# Patient Record
Sex: MALE | Race: WHITE | Employment: FULL TIME | URBAN - NONMETROPOLITAN AREA
[De-identification: names, ages, dates, MRNs, and addresses within clinical notes are randomized per-mention and may not be internally consistent; named-entity substitution may affect disease eponyms.]

---

## 2017-01-11 ENCOUNTER — RADIANT APPOINTMENT (OUTPATIENT)
Dept: GENERAL RADIOLOGY | Facility: CLINIC | Age: 37
End: 2017-01-11
Attending: FAMILY MEDICINE
Payer: COMMERCIAL

## 2017-01-11 ENCOUNTER — OFFICE VISIT (OUTPATIENT)
Dept: FAMILY MEDICINE | Facility: CLINIC | Age: 37
End: 2017-01-11
Payer: COMMERCIAL

## 2017-01-11 VITALS
SYSTOLIC BLOOD PRESSURE: 110 MMHG | HEIGHT: 68 IN | BODY MASS INDEX: 27.43 KG/M2 | DIASTOLIC BLOOD PRESSURE: 68 MMHG | WEIGHT: 181 LBS | HEART RATE: 69 BPM | OXYGEN SATURATION: 99 % | TEMPERATURE: 97.9 F | RESPIRATION RATE: 16 BRPM

## 2017-01-11 DIAGNOSIS — M25.562 ACUTE PAIN OF LEFT KNEE: Primary | ICD-10-CM

## 2017-01-11 DIAGNOSIS — M25.562 ACUTE PAIN OF LEFT KNEE: ICD-10-CM

## 2017-01-11 PROCEDURE — 73565 X-RAY EXAM OF KNEES: CPT

## 2017-01-11 PROCEDURE — 99213 OFFICE O/P EST LOW 20 MIN: CPT | Performed by: FAMILY MEDICINE

## 2017-01-11 NOTE — PROGRESS NOTES
"  SUBJECTIVE:                                                    Sergio Us is a 36 year old male who presents to clinic today for the following health issues:      Knee Pain     Onset: bothersome for years     Description:   Location: Mostly the left knee and right knee  Character: Dull ache    Intensity: moderate    Progression of Symptoms: worse    Accompanying Signs & Symptoms:  Other symptoms: none   History:   Previous similar pain: no       Precipitating factors:   Trauma or overuse: no     Alleviating factors:  Improved by: exercises       Therapies Tried and outcome: Joint supplements.  The more active he is the less pain.       S :Sergio Us is a 36 year old male with L knee pain off/on for years.  No injury.  No swelling/redness. nsaids help some.  Seems to improve with activity.      No leg /ankle swelling    Problem list, med list, additional histories reviewed and updated, as indicated.      O:/68 mmHg  Pulse 69  Temp(Src) 97.9  F (36.6  C) (Tympanic)  Resp 16  Ht 5' 7.75\" (1.721 m)  Wt 181 lb (82.101 kg)  BMI 27.72 kg/m2  SpO2 99%  GEN: Alert and oriented, in no acute distress  Knee without swelling/redness.  Full ROM. lachman's normal, bello's normal.  No medial/lateral ligament pain.  Some mild pain with manipulation of patella    Xray:' normal    A: anterior knee pain, intermittent    P: therapy.  Nsaids/ice prn when bothering.  F/u if not improving.  Sports med referral likely next step if not improving.   "

## 2017-01-11 NOTE — NURSING NOTE
"Chief Complaint   Patient presents with     Knee Pain     /68 mmHg  Pulse 69  Temp(Src) 97.9  F (36.6  C) (Tympanic)  Resp 16  Ht 5' 7.75\" (1.721 m)  Wt 181 lb (82.101 kg)  BMI 27.72 kg/m2  SpO2 99% Estimated body mass index is 27.72 kg/(m^2) as calculated from the following:    Height as of this encounter: 5' 7.75\" (1.721 m).    Weight as of this encounter: 181 lb (82.101 kg).  bp completed using cuff size: regular      Health Maintenance that is potentially due pending provider review:  Denied flu shot.   No other maintenance needed.    Anum Kirby      "

## 2017-01-11 NOTE — MR AVS SNAPSHOT
After Visit Summary   1/11/2017    Sergio Us    MRN: [id number]           Patient Information     Date Of Birth          1980        Visit Information        Provider Department      1/11/2017 4:00 PM Reno Xie MD [facility]        Today's Diagnoses     Acute pain of left knee    -  1        Follow-ups after your visit        Additional Services     PHYSICAL THERAPY REFERRAL       *This therapy referral will be filtered to a centralized scheduling office at [facility] and the patient will receive a call to schedule an appointment at a [facility] location most convenient for them. *     [facility] provides Physical Therapy evaluation and treatment and many specialty services across the [facility] system.  If requesting a specialty program, please choose from the list below.    If you have not heard from the scheduling office within 2 business days, please call [phone] for all locations, with the exception of [place], please call [phone].  Treatment: Evaluation & Treatment  Special Instructions/Modalities: anterior knee pain  Special Programs:     Please be aware that coverage of these services is subject to the terms and limitations of your health insurance plan.  Call member services at your health plan with any benefit or coverage questions.      **Note to Provider:  If you are referring outside of [facility] for the therapy appointment, please list the name of the location in the  special instructions  above, print the referral and give to the patient to schedule the appointment.                  Who to contact     If you have questions or need follow up information about today's clinic visit or your schedule please contact [facility] directly at [phone].  Normal or non-critical lab and imaging results will be communicated to you by MyChart, letter or phone within 4 business days after the clinic has  "received the results. If you do not hear from us within 7 days, please contact the clinic through [employer] or phone. If you have a critical or abnormal lab result, we will notify you by phone as soon as possible.  Submit refill requests through [employer] or call your pharmacy and they will forward the refill request to us. Please allow 3 business days for your refill to be completed.          Additional Information About Your Visit        [employer] Information     [employer] lets you send messages to your doctor, view your test results, renew your prescriptions, schedule appointments and more. To sign up, go to [email or web address] . Click on \"Log in\" on the left side of the screen, which will take you to the Welcome page. Then click on \"Sign up Now\" on the right side of the page.     You will be asked to enter the access code listed below, as well as some personal information. Please follow the directions to create your username and password.     Your access code is: [email or web address]  Expires: 2017  4:30 PM     Your access code will  in 90 days. If you need help or a new code, please call your [facility] clinic or [phone].        Care EveryWhere ID     This is your Care EveryWhere ID. This could be used by other organizations to access your [facility] medical records  [id number]        Your Vitals Were     Pulse Temperature Respirations Height BMI (Body Mass Index) Pulse Oximetry    69 97.9  F (36.6  C) (Tympanic) 16 5' 7.75\" (1.721 m) 27.72 kg/m2 99%       Blood Pressure from Last 3 Encounters:   17 110/68   16 120/76   06/15/16 113/63    Weight from Last 3 Encounters:   17 181 lb (82.101 kg)   16 187 lb (84.823 kg)   06/15/16 182 lb 3.2 oz (82.645 kg)              We Performed the Following     PHYSICAL THERAPY REFERRAL        Primary Care Provider Office Phone # Fax #    Reno Xie [phone]       [facility] [address] [place] [address] " [phone]        Thank you!     Thank you for choosing [facility]  for your care. Our goal is always to provide you with excellent care. Hearing back from our patients is one way we can continue to improve our services. Please take a few minutes to complete the written survey that you may receive in the mail after your visit with us. Thank you!             Your Updated Medication List - Protect others around you: Learn how to safely use, store and throw away your medicines at www.disposemymeds.org.          This list is accurate as of: 1/11/17  4:30 PM.  Always use your most recent med list.                   Brand Name Dispense Instructions for use    ALEVE 220 MG tablet   Generic drug:  naproxen sodium      Take 1 tablet by mouth daily as needed       B COMPLEX PO          FIBER CHOICE PO          glucosamine-chondroitin 500-400 MG Caps per capsule      Take 1 capsule by mouth daily       MAGNESIUM OXIDE PO          Multi-vitamin Tabs tablet      Take 1 tablet by mouth daily       VITAMIN B 12 PO          VITAMIN C PO          VITAMIN D3 PO      Take by mouth daily

## 2017-01-16 ENCOUNTER — HOSPITAL ENCOUNTER (OUTPATIENT)
Dept: PHYSICAL THERAPY | Facility: CLINIC | Age: 37
Setting detail: THERAPIES SERIES
End: 2017-01-16
Attending: FAMILY MEDICINE
Payer: COMMERCIAL

## 2017-01-16 DIAGNOSIS — M25.562 ACUTE PAIN OF LEFT KNEE: Primary | ICD-10-CM

## 2017-01-16 PROCEDURE — 40000718 ZZHC STATISTIC PT DEPARTMENT ORTHO VISIT: Performed by: PHYSICAL THERAPIST

## 2017-01-16 PROCEDURE — 97161 PT EVAL LOW COMPLEX 20 MIN: CPT | Mod: GP | Performed by: PHYSICAL THERAPIST

## 2017-01-16 PROCEDURE — 97110 THERAPEUTIC EXERCISES: CPT | Mod: GP | Performed by: PHYSICAL THERAPIST

## 2017-01-16 NOTE — PROGRESS NOTES
Physical Therapy Evaluation  01/16/17 1400   General Information   Type of Visit Initial OP Ortho PT Evaluation   Start of Care Date 01/16/17   Referring Physician Dr. Xie   Patient/Family Goals Statement Decrease knee pain   Orders Evaluate and Treat   Date of Order 01/11/17   Insurance Type Other  ([employer])   Medical Diagnosis Knee pain   Surgical/Medical history reviewed Yes   Precautions/Limitations no known precautions/limitations       Present No   Body Part(s)   Body Part(s) Knee   Presentation and Etiology   Pertinent history of current problem (include personal factors and/or comorbidities that impact the POC) Bilateral anterior knee pain that improves and worsens day to day.   Impairments A. Pain;B. Decreased WB tolerance;H. Impaired gait   Functional Limitations perform activities of daily living;perform required work activities;perform desired leisure / sports activities   Symptom Location Anterior bilateral knees   How/Where did it occur From insidious onset   Onset date of current episode/exacerbation 01/11/17   Chronicity Recurrent   Pain rating (0-10 point scale) Best (/10);Worst (/10)   Best (/10) 0   Worst (/10) 7   Pain quality A. Sharp;B. Dull;C. Aching;E. Shooting   Frequency of pain/symptoms A. Constant   Pain/symptoms are: The same all the time   Pain/symptoms exacerbated by E. Rest   Pain/symptoms eased by I. OTC medication(s)   Progression of symptoms since onset: Unchanged   Current / Previous Interventions   Diagnostic Tests: X-ray   X-ray Results unremarkable   Prior Level of Function   Prior Level of Function-Mobility independent   Prior Level of Function-ADLs independent   Functional Level Prior Comment independent   Fall Risk Screen   Fall screen completed by PT   Per patient - Fall 2 or more times in past year? Yes   Per patient - Fall with injury in past year? No   Is patient a fall risk? No   Fall screen comments Fell on ice   Functional Scales   Functional  Scales Other   Other Scales  LEFS: 52/80   Knee Objective Findings   Side (if bilateral, select both right and left) Left   Observation patella laterally placed   Posture normal   Gait/Locomotion normal   Foot Position In Standing ER, pronated ankles   Anterior Drawer Test neg   Posterior Drawer Test neg   Varus Stress Test neg   Valgus Stress Test neg   Pantera's Test neg   Apley's Test neg   Palpation TTP lateral and medial patella bilaterally, R ITband   Accessory Motion/Joint Mobility patellar hypermobility   Left Knee Extension AROM 1-2* hyperextension   Left Knee Flexion AROM 135*   Left Knee Flexion Strength 4/5 B   Left Knee Extension Strength 4/5 B   Left Hip Abduction Strength 4-/5 B   Left Quad Set Strength 4-/5 B   Left Gastrocnemius Flexibility limited B   Left Hamstring Flexibility limited B L>R   Left Hip Flexor Flexibility WNL   Planned Therapy Interventions   Planned Therapy Interventions balance training;gait training;joint mobilization;manual therapy;neuromuscular re-education;ROM;strengthening;stretching   Planned Modality Interventions   Planned Modality Interventions Cryotherapy;Electrical stimulation;Hot packs;Iontophoresis;TENS;Traction;Ultrasound   Clinical Impression   Criteria for Skilled Therapeutic Interventions Met yes, treatment indicated   PT Diagnosis Bilateral patellofemoral syndrome   Influenced by the following impairments weakness, patellar hypermobility, decreased flexibility   Functional limitations due to impairments pain with walking, stairs, sitting for long periods of time   Clinical Presentation Stable/Uncomplicated   Clinical Presentation Rationale no co morbidities   Clinical Decision Making (Complexity) Low complexity   Therapy Frequency 1 time/week   Predicted Duration of Therapy Intervention (days/wks) 8 weeks   Risk & Benefits of therapy have been explained Yes   Patient, Family & other staff in agreement with plan of care Yes   Clinical Impression Comments Patient  presents with signs and symptoms of patellofemoral syndrome bilaterally.  The patient demonstrates hip weaknss, decreased flexiblity, quad weakness, and patellar hypermobility and would benefit from conitnued PT to address above deficits.   Education Assessment   Preferred Learning Style Listening;Pictures/video;Demonstration   Barriers to Learning No barriers   ORTHO GOALS   PT Ortho Eval Goals 1;2;3   Ortho Goal 1   Goal Identifier stairs   Goal Description Patient will tolerate ambulating a flight of stairs without an increase in sytmpoms   Target Date 03/13/17   Ortho Goal 2   Goal Identifier gait   Goal Description Patient will ambulate 1/2 mile without an increase in sytmpoms in order to shop or work out at the gym.   Target Date 03/13/17   Ortho Goal 3   Goal Identifier Working out   Goal Description Patient will tolerate performing the leg press machine at the gym without an increase in symptoms.   Target Date 03/13/17   Total Evaluation Time   Total Evaluation Time 30 min   Please contact me with any questions or concerns.    Thank you for your referral,     Minda Swanson, PT, DPT  Physical Therapist  [facility]  [phone]

## 2017-01-25 ENCOUNTER — HOSPITAL ENCOUNTER (OUTPATIENT)
Dept: PHYSICAL THERAPY | Facility: CLINIC | Age: 37
Setting detail: THERAPIES SERIES
End: 2017-01-25
Attending: FAMILY MEDICINE
Payer: COMMERCIAL

## 2017-01-25 PROCEDURE — 40000718 ZZHC STATISTIC PT DEPARTMENT ORTHO VISIT: Performed by: PHYSICAL THERAPIST

## 2017-01-25 PROCEDURE — 97110 THERAPEUTIC EXERCISES: CPT | Mod: GP | Performed by: PHYSICAL THERAPIST

## 2017-02-01 ENCOUNTER — HOSPITAL ENCOUNTER (OUTPATIENT)
Dept: PHYSICAL THERAPY | Facility: CLINIC | Age: 37
Setting detail: THERAPIES SERIES
End: 2017-02-01
Attending: FAMILY MEDICINE
Payer: COMMERCIAL

## 2017-02-01 PROCEDURE — 40000718 ZZHC STATISTIC PT DEPARTMENT ORTHO VISIT: Performed by: PHYSICAL THERAPIST

## 2017-02-01 PROCEDURE — 97110 THERAPEUTIC EXERCISES: CPT | Mod: GP | Performed by: PHYSICAL THERAPIST

## 2017-02-08 ENCOUNTER — HOSPITAL ENCOUNTER (OUTPATIENT)
Dept: PHYSICAL THERAPY | Facility: CLINIC | Age: 37
Setting detail: THERAPIES SERIES
End: 2017-02-08
Attending: FAMILY MEDICINE
Payer: COMMERCIAL

## 2017-02-08 PROCEDURE — 40000718 ZZHC STATISTIC PT DEPARTMENT ORTHO VISIT: Performed by: PHYSICAL THERAPIST

## 2017-02-08 PROCEDURE — 97110 THERAPEUTIC EXERCISES: CPT | Mod: GP | Performed by: PHYSICAL THERAPIST

## 2017-02-15 ENCOUNTER — HOSPITAL ENCOUNTER (OUTPATIENT)
Dept: PHYSICAL THERAPY | Facility: CLINIC | Age: 37
Setting detail: THERAPIES SERIES
End: 2017-02-15
Attending: FAMILY MEDICINE
Payer: COMMERCIAL

## 2017-02-15 PROCEDURE — 97110 THERAPEUTIC EXERCISES: CPT | Mod: GP | Performed by: PHYSICAL THERAPIST

## 2017-02-15 PROCEDURE — 97140 MANUAL THERAPY 1/> REGIONS: CPT | Mod: GP | Performed by: PHYSICAL THERAPIST

## 2017-02-15 PROCEDURE — 40000718 ZZHC STATISTIC PT DEPARTMENT ORTHO VISIT: Performed by: PHYSICAL THERAPIST

## 2017-02-22 ENCOUNTER — HOSPITAL ENCOUNTER (OUTPATIENT)
Dept: PHYSICAL THERAPY | Facility: CLINIC | Age: 37
Setting detail: THERAPIES SERIES
End: 2017-02-22
Attending: FAMILY MEDICINE
Payer: COMMERCIAL

## 2017-02-22 PROCEDURE — 40000718 ZZHC STATISTIC PT DEPARTMENT ORTHO VISIT: Performed by: PHYSICAL THERAPIST

## 2017-02-22 PROCEDURE — 97110 THERAPEUTIC EXERCISES: CPT | Mod: GP | Performed by: PHYSICAL THERAPIST

## 2017-06-11 ENCOUNTER — DOCUMENTATION ONLY (OUTPATIENT)
Dept: OTHER | Facility: CLINIC | Age: 37
End: 2017-06-11

## 2017-06-11 DIAGNOSIS — Z71.89 ACP (ADVANCE CARE PLANNING): Chronic | ICD-10-CM

## 2017-10-09 ENCOUNTER — DOCUMENTATION ONLY (OUTPATIENT)
Dept: OTHER | Facility: CLINIC | Age: 37
End: 2017-10-09

## 2017-10-09 DIAGNOSIS — Z71.89 ACP (ADVANCE CARE PLANNING): Chronic | ICD-10-CM

## 2018-11-01 ENCOUNTER — OFFICE VISIT (OUTPATIENT)
Dept: FAMILY MEDICINE | Facility: CLINIC | Age: 38
End: 2018-11-01
Payer: COMMERCIAL

## 2018-11-01 VITALS
OXYGEN SATURATION: 100 % | HEIGHT: 68 IN | WEIGHT: 202 LBS | DIASTOLIC BLOOD PRESSURE: 62 MMHG | BODY MASS INDEX: 30.62 KG/M2 | RESPIRATION RATE: 12 BRPM | HEART RATE: 72 BPM | SYSTOLIC BLOOD PRESSURE: 120 MMHG | TEMPERATURE: 98.2 F

## 2018-11-01 DIAGNOSIS — R33.9 INCOMPLETE BLADDER EMPTYING: ICD-10-CM

## 2018-11-01 DIAGNOSIS — R07.9 CHEST PAIN, UNSPECIFIED TYPE: ICD-10-CM

## 2018-11-01 DIAGNOSIS — Z76.89 SLEEP CONCERN: ICD-10-CM

## 2018-11-01 DIAGNOSIS — Z00.00 ROUTINE HISTORY AND PHYSICAL EXAMINATION OF ADULT: Primary | ICD-10-CM

## 2018-11-01 DIAGNOSIS — H53.9 VISION CHANGES: ICD-10-CM

## 2018-11-01 LAB
ALBUMIN SERPL-MCNC: 4.1 G/DL (ref 3.4–5)
ALP SERPL-CCNC: 63 U/L (ref 40–150)
ALT SERPL W P-5'-P-CCNC: 29 U/L (ref 0–70)
ANION GAP SERPL CALCULATED.3IONS-SCNC: 9 MMOL/L (ref 3–14)
AST SERPL W P-5'-P-CCNC: 15 U/L (ref 0–45)
BILIRUB SERPL-MCNC: 0.6 MG/DL (ref 0.2–1.3)
BUN SERPL-MCNC: 17 MG/DL (ref 7–30)
CALCIUM SERPL-MCNC: 9.1 MG/DL (ref 8.5–10.1)
CHLORIDE SERPL-SCNC: 105 MMOL/L (ref 94–109)
CO2 SERPL-SCNC: 25 MMOL/L (ref 20–32)
CREAT SERPL-MCNC: 0.81 MG/DL (ref 0.66–1.25)
ERYTHROCYTE [DISTWIDTH] IN BLOOD BY AUTOMATED COUNT: 12.2 % (ref 10–15)
GFR SERPL CREATININE-BSD FRML MDRD: >90 ML/MIN/1.7M2
GLUCOSE BLD-MCNC: 109 MG/DL (ref 70–99)
GLUCOSE SERPL-MCNC: 97 MG/DL (ref 70–99)
HCT VFR BLD AUTO: 43.5 % (ref 40–53)
HGB BLD-MCNC: 15.3 G/DL (ref 13.3–17.7)
MCH RBC QN AUTO: 31.3 PG (ref 26.5–33)
MCHC RBC AUTO-ENTMCNC: 35.2 G/DL (ref 31.5–36.5)
MCV RBC AUTO: 89 FL (ref 78–100)
PLATELET # BLD AUTO: 202 10E9/L (ref 150–450)
POTASSIUM SERPL-SCNC: 4.1 MMOL/L (ref 3.4–5.3)
PROT SERPL-MCNC: 7.6 G/DL (ref 6.8–8.8)
PSA SERPL-ACNC: 0.97 UG/L (ref 0–4)
RBC # BLD AUTO: 4.89 10E12/L (ref 4.4–5.9)
SODIUM SERPL-SCNC: 139 MMOL/L (ref 133–144)
TSH SERPL DL<=0.005 MIU/L-ACNC: 1.36 MU/L (ref 0.4–4)
WBC # BLD AUTO: 6.1 10E9/L (ref 4–11)

## 2018-11-01 PROCEDURE — G0103 PSA SCREENING: HCPCS | Performed by: NURSE PRACTITIONER

## 2018-11-01 PROCEDURE — 80053 COMPREHEN METABOLIC PANEL: CPT | Performed by: NURSE PRACTITIONER

## 2018-11-01 PROCEDURE — 36415 COLL VENOUS BLD VENIPUNCTURE: CPT | Performed by: NURSE PRACTITIONER

## 2018-11-01 PROCEDURE — 99214 OFFICE O/P EST MOD 30 MIN: CPT | Mod: 25 | Performed by: NURSE PRACTITIONER

## 2018-11-01 PROCEDURE — 82947 ASSAY GLUCOSE BLOOD QUANT: CPT | Mod: 59 | Performed by: NURSE PRACTITIONER

## 2018-11-01 PROCEDURE — 85027 COMPLETE CBC AUTOMATED: CPT | Performed by: NURSE PRACTITIONER

## 2018-11-01 PROCEDURE — 99395 PREV VISIT EST AGE 18-39: CPT | Performed by: NURSE PRACTITIONER

## 2018-11-01 PROCEDURE — 84443 ASSAY THYROID STIM HORMONE: CPT | Performed by: NURSE PRACTITIONER

## 2018-11-01 PROCEDURE — 93010 ELECTROCARDIOGRAM REPORT: CPT | Performed by: NURSE PRACTITIONER

## 2018-11-01 ASSESSMENT — ENCOUNTER SYMPTOMS
DIARRHEA: 0
HEMATOCHEZIA: 0
ARTHRALGIAS: 0
NAUSEA: 0
DIZZINESS: 0
WEAKNESS: 1
SORE THROAT: 0
EYE PAIN: 1
ABDOMINAL PAIN: 0
SHORTNESS OF BREATH: 0
HEARTBURN: 0
FREQUENCY: 0
HEADACHES: 0
PALPITATIONS: 0
FEVER: 0
DYSURIA: 0
NERVOUS/ANXIOUS: 0
COUGH: 0
CHILLS: 1
MYALGIAS: 0
CONSTIPATION: 0
JOINT SWELLING: 0
HEMATURIA: 0
PARESTHESIAS: 0

## 2018-11-01 ASSESSMENT — PAIN SCALES - GENERAL: PAINLEVEL: NO PAIN (0)

## 2018-11-01 NOTE — LETTER
November 2, 2018      Sergio Us  [address] [place] [phone]        Dear ,    We are writing to inform you of your test results.    Your test results fall within the expected range(s).    Normal non fasting glucose.     Resulted Orders   TSH with free T4 reflex   Result Value Ref [facility]    TSH 1.36 0.40 - 4.00 mU/L   Glucose, whole blood   Result Value Ref [facility]    Glucose Whole Blood 109 (H) 70 - 99 mg/dL   CBC with platelets   Result Value Ref [facility]    WBC 6.1 4.0 - 11.0 10e9/L    RBC Count 4.89 4.4 - 5.9 10e12/L    Hemoglobin 15.3 13.3 - 17.7 g/dL    Hematocrit 43.5 40.0 - 53.0 %    MCV 89 78 - 100 fl    MCH 31.3 26.5 - 33.0 pg    MCHC 35.2 31.5 - 36.5 g/dL    RDW 12.2 10.0 - 15.0 %    Platelet Count 202 150 - 450 10e9/L   Comprehensive metabolic panel   Result Value Ref [facility]    Sodium 139 133 - 144 mmol/L    Potassium 4.1 3.4 - 5.3 mmol/L    Chloride 105 94 - 109 mmol/L    Carbon Dioxide 25 20 - 32 mmol/L    Anion Gap 9 3 - 14 mmol/L    Glucose 97 70 - 99 mg/dL    Urea Nitrogen 17 7 - 30 mg/dL    Creatinine 0.81 0.66 - 1.25 mg/dL    GFR Estimate >90 >60 mL/min/1.7m2      Comment:      Non  GFR Calc    GFR Estimate If Black >90 >60 mL/min/1.7m2      Comment:       GFR Calc    Calcium 9.1 8.5 - 10.1 mg/dL    Bilirubin Total 0.6 0.2 - 1.3 mg/dL    Albumin 4.1 3.4 - 5.0 g/dL    Protein Total 7.6 6.8 - 8.8 g/dL    Alkaline Phosphatase 63 40 - 150 U/L    ALT 29 0 - 70 U/L    AST 15 0 - 45 U/L   PSA, screen   Result Value Ref [facility]    PSA 0.97 0 - 4 ug/L      Comment:      Assay Method:  Chemiluminescence using Siemens Vista analyzer       If you have any questions or concerns, please call the clinic at the number listed above.       Sincerely,        Mari Galvan, CNP

## 2018-11-01 NOTE — NURSING NOTE
"Chief Complaint   Patient presents with     Physical       Initial Pulse 72  Temp 98.2  F (36.8  C) (Tympanic)  Resp 12  Ht 5' 7.5\" (1.715 m)  Wt 202 lb (91.6 kg)  SpO2 100%  BMI 31.17 kg/m2 Estimated body mass index is 31.17 kg/(m^2) as calculated from the following:    Height as of this encounter: 5' 7.5\" (1.715 m).    Weight as of this encounter: 202 lb (91.6 kg).    Patient presents to the clinic using No DME    Health Maintenance that is potentially due pending provider review:  none    n/a    Is there anyone who you would like to be able to receive your results? No  If yes have patient fill out OTM    "

## 2018-11-01 NOTE — PROGRESS NOTES
SUBJECTIVE:   CC: Sergio Us is an 38 year old male who presents for preventative health visit.     Physical   Annual:     Getting at least 3 servings of Calcium per day:  NO    Bi-annual eye exam:  Yes    Dental care twice a year:  Yes    Sleep apnea or symptoms of sleep apnea:  None    Diet:  Other    Frequency of exercise:  2-3 days/week    Duration of exercise:  30-45 minutes    Taking medications regularly:  Yes    Medication side effects:  Not applicable    Additional concerns today:  Yes    SLEEP  He can't get to sleep unless he has a mixed drink. Regularly change mattress. Sometimes joint pain. Can't get comfortable. Head hurts      SLEEP HISTORY    Childhood  Sleep Walking: no  Night Terrors: no    Sleep history  Bruxism: sometimes    Dreams/Nightmares: no    Walk, Talk, Moan: no  Act Out, Bonner, Thrash: no  Gasping for air: no  Do you fall asleep unintentionally during the day: no  Restless Legs: no  FMHX of sleep disorders: no    Sleep practice currently  Total hours of sleep: 7-9 hrs  Bedtime: midnight  Wake-up: 7:00- 9:00am  Work: group home  Do you take naps (time): no   Caffeine: coffee a few cups/day, pop diet mtn dew couple  Alcohol: before bedtime  Drugs: no  Use of cell phones, tv, computer before going to bed:t.v., play on phone  Do pets sleep with you: cat on bed- doesn't sleep, [place]  Do you share the bed with someone: yes  Do they have sleep problems: snores occasionally    __________________________________    Random chest pain  Tired the last few months- working on diet  Likes to have heat on, otherwise cold  ey  Eyes  A little weird sensation, wears glasses, hard time focusing on people  Last eye exam in January 2018    Bladder  Hard time starting a flow  Sometimes has to stand and lay    WEIGHT  Chronic, worsened  Weight Watchers re-started  Exercise routine- walk a couple times a week 30- 60 minutes, casual if with with spouse  Weight gain: YES 21 lbs in 1 year  Wt Readings from Last  10 Encounters:   18 202 lb (91.6 kg)   17 181 lb (82.1 kg)   16 187 lb (84.8 kg)   06/15/16 182 lb 3.2 oz (82.6 kg)   13 259 lb (117.5 kg)   12 255 lb (115.7 kg)   12 255 lb 3.2 oz (115.8 kg)         Today's PHQ-2 Score:   PHQ-2 (  [employer]) 2018   Q1: Little interest or pleasure in doing things 0   Q2: Feeling down, depressed or hopeless 0   PHQ-2 Score 0   Q1: Little interest or pleasure in doing things Not at all   Q2: Feeling down, depressed or hopeless Not at all   PHQ-2 Score 0       Abuse: Current or Past(Physical, Sexual or Emotional)- No  Do you feel safe in your environment - Yes    Social History   Substance Use Topics     Smoking status: Never Smoker     Smokeless tobacco: Never Used     Alcohol use Yes      Comment: rare     Alcohol Use 2018   If you drink alcohol do you typically have greater than 3 drinks per day OR greater than 7 drinks per week? No       Last PSA: No results found for: PSA    Reviewed orders with patient. Reviewed health maintenance and updated orders accordingly - Yes  Labs reviewed in EPIC  BP Readings from Last 3 Encounters:   18 120/62   17 110/68   16 120/76    Wt Readings from Last 3 Encounters:   18 202 lb (91.6 kg)   17 181 lb (82.1 kg)   16 187 lb (84.8 kg)            Patient Active Problem List   Diagnosis     Family history of diabetes mellitus     Family history of thyroid disorder     CARDIOVASCULAR SCREENING; LDL GOAL LESS THAN 130     Obesity     ACP (advance care planning)     Patient is Confucianism     History reviewed. No pertinent surgical history.    Social History   Substance Use Topics     Smoking status: Never Smoker     Smokeless tobacco: Never Used     Alcohol use Yes      Comment: rare     Family History   Problem Relation Age of Onset     Myocardial Infarction Father 44          Diabetes Brother      type 1     Diabetes Mother      type 2     Cerebrovascular  Disease Mother      Thyroid Disease Mother      Cancer Other      multiple in the extended family, unsure of types         Current Outpatient Prescriptions   Medication Sig Dispense Refill     Ascorbic Acid (VITAMIN C PO)        B Complex Vitamins (B COMPLEX PO)        Cholecalciferol (VITAMIN D3 PO) Take by mouth daily       Cyanocobalamin (VITAMIN B 12 PO)        glucosamine-chondroitin 500-400 MG CAPS Take 1 capsule by mouth daily       Inulin (FIBER CHOICE PO)        MAGNESIUM OXIDE PO        multivitamin, therapeutic with minerals (MULTI-VITAMIN) TABS Take 1 tablet by mouth daily       naproxen sodium (ALEVE) 220 MG tablet Take 1 tablet by mouth daily as needed       No Known Allergies    Reviewed and updated as needed this visit by clinical staff  Tobacco  Allergies  Meds  Med Hx  Surg Hx  Fam Hx  Soc Hx        Reviewed and updated as needed this visit by Provider            Review of Systems   Constitutional: Positive for chills. Negative for fever.   HENT: Negative for congestion, ear pain, hearing loss and sore throat.    Eyes: Positive for pain and visual disturbance.   Respiratory: Negative for cough and shortness of breath.    Cardiovascular: Positive for chest pain. Negative for palpitations and peripheral edema.   Gastrointestinal: Negative for abdominal pain, constipation, diarrhea, heartburn, hematochezia and nausea.   Genitourinary: Negative for discharge, dysuria, frequency, genital sores, hematuria, impotence and urgency.   Musculoskeletal: Negative for arthralgias, joint swelling and myalgias.   Skin: Negative for rash.   Neurological: Positive for weakness. Negative for dizziness, headaches and paresthesias.   Psychiatric/Behavioral: Negative for mood changes. The patient is not nervous/anxious.        CONSTITUTIONAL: NEGATIVE for fever, chills, change in weight  INTEGUMENTARY/SKIN: NEGATIVE for worrisome rashes, moles or lesions  EYES: NEGATIVE for vision changes or irritation  ENT:  "NEGATIVE for ear, mouth and throat problems  RESP: NEGATIVE for significant cough or SOB  CV: NEGATIVE for chest pain, palpitations or peripheral edema  GI: NEGATIVE for nausea, abdominal pain, heartburn, or change in bowel habits   male: negative for dysuria, hematuria, decreased urinary stream, erectile dysfunction, urethral discharge  MUSCULOSKELETAL: NEGATIVE for significant arthralgias or myalgia  NEURO: NEGATIVE for weakness, dizziness or paresthesias  ENDOCRINE: NEGATIVE for temperature intolerance, skin/hair changes  HEME/ALLERGY/IMMUNE: NEGATIVE for bleeding problems  PSYCHIATRIC: NEGATIVE for changes in mood or affect    EKG- NSR  OBJECTIVE:   /62 (BP Location: Right arm, Patient Position: Chair, Cuff Size: Adult Regular)  Pulse 72  Temp 98.2  F (36.8  C) (Tympanic)  Resp 12  Ht 5' 7.5\" (1.715 m)  Wt 202 lb (91.6 kg)  SpO2 100%  BMI 31.17 kg/m2    Physical Exam  GENERAL: healthy, alert and no distress  EYES: Eyes grossly normal to inspection, PERRL and conjunctivae and sclerae normal  HENT: ear canals and TM's normal, nose and mouth without ulcers or lesions  NECK: no adenopathy, no asymmetry, masses, or scars and thyroid normal to palpation  RESP: lungs clear to auscultation - no rales, rhonchi or wheezes  CV: regular rate and rhythm, normal S1 S2, no S3 or S4, no murmur, click or rub, no peripheral edema and peripheral pulses strong  ABDOMEN: soft, nontender, no hepatosplenomegaly, no masses and bowel sounds normal  MS: no gross musculoskeletal defects noted, no edema  SKIN: no suspicious lesions or rashes  NEURO: Normal strength and tone, mentation intact and speech normal  PSYCH: mentation appears normal, affect normal/bright      ASSESSMENT/PLAN:   Time spent: 60 minutes  with patient; greater than one half devoted to coordination of care for diagnosis and plan above- multiple issues today, sleep hygiene, weight and healthy lifestyle education.       1. Routine history and physical " examination of adult  Screening  - TSH with free T4 reflex  - Glucose, whole blood  Recheck with me in 1 month    2. Sleep concern  Chronic,stable  Stop using alcohol to sleep  Follow sleep hygiene tips. Recheck in a month with Dr. Xie  Melatonin 3 mg 2 hours before sleep (Nature Made brand, or Morfin)  Tylenol or Ibuprofen an hour before bed  Push fluids during the day- water!!!  Reduce Mtn Dew consumption    3. BMI 31.0-31.9,adult  Chronic, worsened  Increase physical activity- goal 10 lbs weight loss  See below  Wt Readings from Last 10 Encounters:   11/01/18 202 lb (91.6 kg)   01/11/17 181 lb (82.1 kg)   08/05/16 187 lb (84.8 kg)   06/15/16 182 lb 3.2 oz (82.6 kg)   08/08/13 259 lb (117.5 kg)   08/30/12 255 lb (115.7 kg)   05/21/12 255 lb 3.2 oz (115.8 kg)     4. Chest pain, unspecified type  Chronic, stable  - EKG [facility] READ  - CBC with platelets  - Comprehensive metabolic panel    5. Incomplete bladder emptying  Chronic, stable  - PSA, screen    6. Vision changes  Acute  Recheck with eye specialist    Here are some tips for how you can improve your sleep hygiene:    Don't go to bed unless you are sleepy.   If you are not sleepy at bedtime, then do something else.   Read a book, listen to soft music or browse through a magazine. Find something relaxing, but not stimulating, to take your mind off of worries about sleep. This will relax your body and distract your mind.    Make your bedroom an oasis of peace.  It should be a little bit cool (ideal temperature 65-75 degrees Fahrenheit), dark (blackout shades), quiet (use hearing aids), aromatherapy (lavender, chamomile, ylang-ylang activate the alpha wave activity in your brain which improves relaxation) comfortable sheets/blankets/pillow- you might need new ones. Check to make sure you have a comfortable mattress (they should be changed every 10 years). Consider kicking your pets to the curb (more than half people who sleep with pets note disturbed  sleep). Don t open bills, or do work in your bedroom.    If you are not asleep after 20 minutes, then get out of bed.  Find something else to do that will make you feel relaxed. If you can, do this in another room. Your bedroom should be where you go to sleep. It is not a place to go when you are bored. Once you feel sleepy again, go back to bed.    Follow a sleep ritual to help you relax each night before bed.  This can include such things as a warm bath, light snack, listen to a meditation elvira, or read for a few minutes. Don t make your  to do  lists before bed.    Set a sleep schedule.   Wake-up at the same time daily.Do this even on weekends and holidays. This keeps your circadian rhythm steady.    Keep a regular schedule  Regular times for meals, medications, chores, and other activities help keep the inner body check running smoothly    Don t read, write, eat, watch TV, talk on the phone, or use your mobile device 1 hour before in bed.  Reduce stimulation and your exposure to blue and white light given off by phones/laptops/iPads and other devices prevent our brains from releasing melatonin (a hormone that tells our bodies it s nighttime).    Don t have caffeine after lunch.    Do not have a cigarette or any other source of nicotine before bedtime, better yet, Quit smoking.  Nicotine is a stimulant. Smokers are 4 times more likely to not feel rested.    Avoid alcohol before bedtime  A few hours after drinking, alcohol levels in your blood stream start to drop, and this can signal your body to wake up. Finish your last sip 2 hours before bedtime.     Don t take naps during the day. This will affect your circadian rhythm.    Avoid strenuous exercise within 2 hours of bedtime.  You should exercise on a regular basis, but do it earlier in the day.    Avoid sleeping pills.  Most medical prescribers only prescribe certain medications for short periods of time (no longer than 3 weeks).    Try to get rid of or deal  with things that make you worry.  If you are unable to do this, then find a time during the day to get all of your worries out of your system- exercise, meditate, pray, journal writing, or just be still for 5 minutes.    Do not go to bed hungry, but don t eat a big meal near bedtime either.  If you are having disruptions at night, remove them. Keep pets out of the bedroom, have partner sleep in a different room if snoring/restless.    Be comfortable. If your mattress is old, get a new one. Mattresses should be changed every 10 years.     Keep a sleep diary to see how you are doing  You can download free apps on your phone: Pillow, Sleep better, SleepBot    Resources:  National Sleep Foundation   https://sleepfoundation.org/    National [place] of Neurological Disorders and Stroke    https://www.ninds.nih.gov/Disorders/Patient-Caregiver-Education/Understanding-Sleep    National [place] of Health- Why is sleep important?  https://www.nhlbi.nih.gov/health/health-topics/topics/sdd/why        HOW TO BE HEALTHIER    Tools to use: www.sparkpeople.com- FREE website that helps you with food choices, and exercise. You can talk with people, talk to trainers and dieticians.    Increase your water intake daily to 6 glasses     Purchase a pedometer that will monitor how many steps you take (10, 000 daily is a goal for everyone)- these are sometimes included in smart phones.    Use meal replacements such as Janene's meals, Lean Cuisines, Healthy Choice, Smart Ones, Weight Watchers Meals, and Slim Fast and Glucerna shakes and supplement with fresh fruits and vegetables    Please drink a lot of water daily. Most people typically need about 2 liters of water daily and more if they are exercising, have a large weight, or have a fever or illness. Add Crystal Light for flavoring if desired. But no pop with calories in it.    Keep a food journal of what you eat, calories in what you eat, and mood. You will see where you are getting the  majority of your calories. Bring the journal with you to your nutrition appointment (if you are being referred).    Consider using applications for smart phones such as Panorama Education, YeePay, HightowerReciViewglass, LoseIt, Tap&Track, and RelaxM.    Focus on wet volumetrics, meaning, eat more foods that are high in water and fiber such as fruits and vegetables in order to get that full feeling. These are also good for your overall health as well.    Check out Dr. Santa Reynaga from [facility] - she has cookbooks with low calorie volumetric recipes    You can try Let's Dish to help you prepare meals for you and your family. Often times, the caloric and nutrition data and serving sizes are available for this food. This can be a time saving maneuver. The website can give you more information http://www.Littlecast/    C[employer] has Let's Dish & fresh low calorie salads    Check out Hello Fresh at https://www.Pipedrive/food-boxes/classic-box/    Try Cooking Light recipes for low calorie meal preparation and planning    Other food plan options you can search for on the internet and check out include: Willy DUARTE, [facility]    Here are 10 ways to get you moving more often:   1. Be less efficient. People typically try to think of ways to make daily tasks easier. But if we make them harder, we can get more exercise, says Staci Neves MS, of [employer] Gonzalez, a , , and spokeswoman for the [employer] [place] on Exercise (ACE).  Bring in the groceries from your car one bag at a time so you have to make several trips,  Edinson says.  Put the laundry away a few items at a time, rather than carrying it up in a basket.    2. Shun labor-saving devices. Wash the car by hand rather than taking it to the car wash.  It takes about an hour and a half to do a good job, and in the meantime you ve gotten great exercise,  Edinson says. Use a push mower rather than a riding mower to  groom your lawn.   3. Going somewhere? Take the long way. Walking up or down a few flights of stairs each day can be good for your heart. Avoid elevators and escalators whenever possible. If you ride the bus or subway to work, get off a stop before your office and walk the extra distance. When you go to the mall or the grocery store, park furthest from the entrance, not as close to it as you can, and you'll get a few extra minutes of walking -- one of the best exercises there is, Dr. Youngblood says.  Walking is great because anyone can do it and you don t need any special equipment other than a properly fitting pair of sneakers.    4. Be a morning person. Studies show that people who exercise in the morning are more likely to stick with it. As Edinson explains,  Are you going to feel like exercising at the end of a hard day? Probably not. If you do your workout in the morning, you re not only more likely to do it, but you'll also set a positive tone for the day.    5. Ink the deal. Whether morning, afternoon, or evening, pick the time that is most convenient for you to exercise and write it down in your daily planner. Keep your exercise routine as you would keep any appointment.   6. Watch your step. Investing in a good pedometer can help you stay motivated.  If you have a pedometer attached to your waist and you can see how many steps you ve taken, you ll see it doesn t take long to walk 5,000 steps and you will be inspired,  Edinson says. And building up to 10,000 steps a day won t seem like such a daunting a task.   7. Hire the right help. While weight training is important, if you don t know what you re doing, you run the risk of injuring yourself or not being effective, Edinson says. It s best to get instructions from a  at the gym. You also can buy a weight-training DVD and follow along in your living room.   8. Keep records. Grab a diary or logbook, and every day that you exercise, write down  what you did and for how long. Your records will make it easy for you to see what you ve accomplished and make you more accountable. Blank pages? You d be ashamed.   9. Phone a friend. Find someone who likes the same activity that you do -- walking in the neighborhood, riding bikes, playing tennis -- and make a date to do it together.  Exercising with a friend or in a group can be very motivating,  Ford says.  You are likely to walk longer or bike greater distances if you re talking to a friend along the way. The time will go by faster.  Don t have a morgan who is available? Grab an MP3 player and listen to your favorite music or an audio book while exercising.   10. Do what you like. Whatever exercise you choose, be sure it s one that you enjoy. You re more likely to stick with it if it s something you have fun doing rather than something you see as a chore, Ford says.   If you can t fit 40 minutes a day into your schedule, get more exercise simply by being less efficient with your chores and adding a little extra walking distance everywhere you go. However, if you pick an activity you like, finding time for fitness will become effortless and the rewards enormous.   BOOKS ON WEIGHT LOSS  A course in Weight Loss by Earnest Barrow    Eat, Drink, and Weigh Less by [place] researcher Jamal Gaona, and Carmen Odom    Ultrametabolism by Pollo Zamora. Recommend keeping Carbohydrates to  30-45g/meal and 15g/snack with fiber of at least 4g/serving, protein goal of 60-90g/day. Keep food diary on myfitnesspal.com. Recommend pedometer with ultimate goal of 10,000 steps a day.     The Willpower Instinct by Silvia South.    Finding Your Ideal Weight  Most of the people in magazines and on TV are far slimmer than average, yet this is the  ideal  that many people aim for. Before you decide that you won t be happy until you get down to a certain number of pounds, consider:      Your age. You probably wish you could  get back to your college weight. But normal changes in metabolism and hormone levels that occur with aging make it unrealistic.    Your gender. In general, men have more muscle and heavier bones than women, which means that healthy men usually weigh more than healthy women of the same height.    Your current weight. If you are very heavy, focus on losing a smaller amount (such as 10 percent of your body weight). Losing just 5 to 10 pounds can improve your health.  Your Body Fat Percentage  A pound of muscle weighs the same as a pound of fat, but it takes up less space. Think of a trained athlete and a  couch potato.  Even though they may be the same height and weight, the athlete looks fitter, is healthier, and probably wears a smaller size of clothing. If you are muscular, a body fat test may be a more accurate measure of your ideal weight than the bathroom scale. Talk to your healthcare provider, who can help you set appropriate goals for yourself.    Weight Management: Healthy Eating  Food is your body s fuel. You can t live without it. The key is to give your body enough nutrients and energy without eating too much. Reading food labels can help you make healthy choices. Also, learn new eating habits to manage your weight.      All the values on the label are based on one serving. The serving size is the average portion. Remember to multiply the values on the label by the number of servings you eat.   Eat Less Fat  A gram of fat has almost twice the calories of a gram of protein or carbohydrates. Try to balance your food choices so that 20% to 35% of your calories comes from total fat. This means an average of 2  to 3  grams of fat for each 100 calories you eat.  Eat More Fiber  High-fiber foods are digested more slowly than low-fiber foods, so you feel full longer. Try to get 31 grams of fiber each day. Foods high in fiber include:    Vegetables and fruits    Whole-grain or bran breads, pastas, and  cereals    Legumes (beans) and peas  As you begin to eat more fiber, be sure to drink plenty of water to keep your digestive system working smoothly.  Tips    Don t skip meals. This often leads to overeating later on. It s best to spread your eating throughout the day.    Eat a variety of foods, not just a few favorites.    If you find yourself eating when you re not hungry, ask yourself why. Many of us eat when we re bored, stressed, or just to be polite. Listen to your body. If you re not hungry, get busy doing something else instead of eating.    Eat slower. It takes 20 minutes for your stomach to tell your brain that it s full.    Pay attention to what you eat. Don t read or watch TV during your meal.      Weight Management: Exercise and Activity  Goal: at least 40 minutes daily  Studies show that people who exercise are the most likely to lose weight and keep it off. Exercise burns calories. It helps build muscle to make your body stronger. Make exercise part of your weight-management plan. You must hit moderate- to high cardiovascular workout for weight loss. Get your heart rate up to 75% to help you burn fat. Check online for free calculators.  Make Activity Part of Your Day  You may not think you have the time to exercise. But you can work activity into your daily life. Take 10 minutes out of your lunch hour to take a walk. Walk to the Drugstore.comstand to get your paper instead of having it delivered. Make it a habit to take the stairs instead of the elevator. Park in the farthest parking spot instead of the closest. You ll be surprised at how fast these little changes can make a difference.  The Benefits of Exercise    Exercise increases your metabolism (the speed at which your body burns calories).    Regular exercise can increase the amount of muscle in your body. Muscle burns calories faster than fat. The more muscle you have, the more calories you burn.    Exercise gives you energy and curbs your  appetite.    Exercise decreases stress and helps you sleep better.  Make Exercise Fun  Exercise can be fun. Choose an activity you enjoy. You may even get a friend to do it with you.    Take a resistance-training or aerobics class    Join a team sport    Take a dance class    Walk the dog    Ride a bike  If you have health problems, be sure to ask your doctor before you start an exercise program. Have a  help you develop a plan that s safe for you.     4520-7282 [facility], [address] [place] [address], [place] [address]. All rights reserved. This information is not intended as a substitute for professional medical care. Always follow your healthcare professional's instructions.    OTHER AVENUES TO INCREASE PHYSICAL FITNESS    Mapping Walks and Runs, biking   www.[email or web address]   www.mapmybike.com    Rating walkability of a neighborhood   www.[employer]    Treadmill Work Stations   www.[employer]    Free Workouts at home   www.[employer]    Walking, hiking, biking trails   www.ralistotrails.com    Active volunteer opportunities   www.[employer]    Race or walk/run events in your area   www.active.come    [employer]    [email or web address]   [employer]    [employer]ations   www."[employer]    Heart rate Monitors   www.polarusa.com    Pedometers   www.[employer]    Outdoor treasure hunts on your own   www.[employer]    7 Worst Junk Ingredients to Avoid   (If you don't know what it is, it probably isn't good to eat it!)  1. Sodium Nitrate (also called Sodium Nitrite)  This is a preservative, coloring, and flavoring commonly found in processed meats like bryant, ham, hot dogs, cold cuts and smoked fish. Studies have shown that it reacts with the body s digestive acids to form a cancer-causing agent called nitrosamines. So double-check that  healthy  turkey for carcinogens before you gobble down your sandwich!  2. Aspartame (aka NutraSweet/Equal)  In  scientific terms, this is a chemical combination of two amino acids and methanol. It s better known by the brand names NutraSweet and Equal, which are sweeteners found in countless  diet  desserts, drink mixes, and soft drinks. Aspartame was once thought to be a safe artificial sweetener, but it is now believed to cause cancer and neurological problems such as dizziness and hallucinations.  3. Acesulfame-K  This artificial sweetener is 200 times sweeter than sugar and is often found in chewing gum and soft drinks. When tested in the laboratory, it caused cancer in rats. And that makes this additive a lot less sweet in our opinion!  4. Artificial Food Colorings  There are food colorings being used that are linked with cancer in animal testing as well as behavioral disorders in children. These include Yellow 5, Red 40, Blue 1, Blue 2, Green 3, Orange B, Red 3 and Yellow 6. Amazingly, these colors have been banned in the [place] yet remain in many [place] foods. They can easily be avoided by choosing natural foods that aren t chemically or colorfully enhanced.  5. MSG  Monosodium Glutamate (MSG) is an amino acid used as a flavor enhancer in many soups, salad dressings, chips, frozen entrees and restaurant food. This nasty additive can alejandro with the nervous system causing side effects like migraines and overeating in some individuals. MSG appears on labels under several aliases, including yeast extract and calcium caseinate. It s even been found on the labels of organic products! Here s a list of the common aliases for MSG.    Monosodium Glutamate    Hydrolyzed Vegetable Protein    Hydrolyzed Protein    Hydrolyzed Plant Protein    Plant Protein Extract    Sodium Caseinate    Calcium Caseinate    Yeast Extract    Textured Protein    Autolyzed Yeast    Hydrolyzed Oat Flour    6. Trans Fats  Trans fats cause heart disease. It s a proven fact. Before purchasing any packaged food, check the ingredients list. Even  if the label boasts  0g trans fats  BEWARE the product may still contain up to a 0.5g of trans fats per serving, if you see the words partially hydrogenated oils on the ingredients list. It s important to avoid even the smallest amount because it can raise your bad cholesterol and lower your good cholesterol, making you susceptible to all kinds of health problems!  7. Sodium Benzoate  Sodium benzoate is a preservative used in many foods and beverages. This ingredient is known to cause hives, asthma and other allergic reactions in sensitive individuals. New research shows that it may also cause behavioral disorders in children. One more reason to avoid this harmful ingredient: When used in beverages that also contain ascorbic acid (vitamin C) it forms benzene, a known carcinogen. Some drink manufacturers are still using this toxic duo, so you may have benzene lurking in your favorite drink!            Preventive Health Recommendations  Male Ages 26 - 39    Yearly exam:             See your health care provider every year in order to  o   Review health changes.   o   Discuss preventive care.    o   Review your medicines if your doctor has prescribed any.    You should be tested each year for STDs (sexually transmitted diseases), if you re at risk.     After age 35, talk to your provider about cholesterol testing. If you are at risk for heart disease, have your cholesterol tested at least every 5 years.     If you are at risk for diabetes, you should have a diabetes test (fasting glucose).  Shots: Get a flu shot each year. Get a tetanus shot every 10 years.     Nutrition:    Eat at least 5 servings of fruits and vegetables daily.     Eat whole-grain bread, whole-wheat pasta and brown rice instead of white grains and rice.     Get adequate Calcium and Vitamin D.     Lifestyle    Exercise for at least 150 minutes a week (30 minutes a day, 5 days a week). This will help you control your weight and prevent disease.  "    Limit alcohol to one drink per day.     No smoking.     Wear sunscreen to prevent skin cancer.     See your dentist every six months for an exam and cleaning.           COUNSELING:   Reviewed preventive health counseling, as reflected in patient instructions    BP Readings from Last 1 Encounters:   11/01/18 120/62     Estimated body mass index is 31.17 kg/(m^2) as calculated from the following:    Height as of this encounter: 5' 7.5\" (1.715 m).    Weight as of this encounter: 202 lb (91.6 kg).      Weight management plan: see plan     reports that he has never smoked. He has never used smokeless tobacco.      Counseling Resources:  ATP IV Guidelines  Pooled Cohorts Equation Calculator  FRAX Risk Assessment  ICSI Preventive Guidelines  Dietary Guidelines for Americans, 2010  USDA's MyPlate  ASA Prophylaxis  Lung CA Screening    Mari Galvan, CNP  [facility]  Answers for HPI/ROS submitted by the patient on 11/1/2018   PHQ-2 Score: 0    "

## 2018-11-01 NOTE — MR AVS SNAPSHOT
After Visit Summary   11/1/2018    Sergio Us    MRN: [id number]           Patient Information     Date Of Birth          1980        Visit Information        Provider Department      11/1/2018 2:00 PM Mari Galvan, CNP [facility]        Today's Diagnoses     Routine history and physical examination of adult    -  1    Sleep concern        BMI 31.0-31.9,adult        Chest pain, unspecified type        Incomplete bladder emptying        Vision changes          Care Instructions    1. Routine history and physical examination of adult  Screening  - TSH with free T4 reflex  - Glucose, whole blood  Recheck with me in 1 month    2. Sleep concern  Chronic,stable  Stop using alcohol to sleep  Follow sleep hygiene tips. Recheck in a month with Dr. Xie  Melatonin 3 mg 2 hours before sleep (Nature Made brand, or Morfin)  Tylenol or Ibuprofen an hour before bed  Push fluids during the day- water!!!  Reduce Mtn Dew consumption    3. BMI 31.0-31.9,adult  Chronic, worsened  Increase physical activity- goal 10 lbs weight loss  See below  Wt Readings from Last 10 Encounters:   11/01/18 202 lb (91.6 kg)   01/11/17 181 lb (82.1 kg)   08/05/16 187 lb (84.8 kg)   06/15/16 182 lb 3.2 oz (82.6 kg)   08/08/13 259 lb (117.5 kg)   08/30/12 255 lb (115.7 kg)   05/21/12 255 lb 3.2 oz (115.8 kg)     4. Chest pain, unspecified type  Chronic, stable  - EKG 12-[facility] READ  - CBC with platelets  - Comprehensive metabolic panel    5. Incomplete bladder emptying  Chronic, stable  - PSA, screen    6. Vision changes  Acute  Recheck with eye specialist    Here are some tips for how you can improve your sleep hygiene:    Don't go to bed unless you are sleepy.   If you are not sleepy at bedtime, then do something else.   Read a book, listen to soft music or browse through a magazine. Find something relaxing, but not stimulating, to take your mind off of worries about sleep. This will relax your  body and distract your mind.    Make your bedroom an oasis of peace.  It should be a little bit cool (ideal temperature 65-75 degrees Fahrenheit), dark (blackout shades), quiet (use hearing aids), aromatherapy (lavender, chamomile, ylang-ylang activate the alpha wave activity in your brain which improves relaxation) comfortable sheets/blankets/pillow- you might need new ones. Check to make sure you have a comfortable mattress (they should be changed every 10 years). Consider kicking your pets to the curb (more than half people who sleep with pets note disturbed sleep). Don t open bills, or do work in your bedroom.    If you are not asleep after 20 minutes, then get out of bed.  Find something else to do that will make you feel relaxed. If you can, do this in another room. Your bedroom should be where you go to sleep. It is not a place to go when you are bored. Once you feel sleepy again, go back to bed.    Follow a sleep ritual to help you relax each night before bed.  This can include such things as a warm bath, light snack, listen to a meditation elvira, or read for a few minutes. Don t make your  to do  lists before bed.    Set a sleep schedule.   Wake-up at the same time daily.Do this even on weekends and holidays. This keeps your circadian rhythm steady.    Keep a regular schedule  Regular times for meals, medications, chores, and other activities help keep the inner body check running smoothly    Don t read, write, eat, watch TV, talk on the phone, or use your mobile device 1 hour before in bed.  Reduce stimulation and your exposure to blue and white light given off by phones/laptops/iPads and other devices prevent our brains from releasing melatonin (a hormone that tells our bodies it s nighttime).    Don t have caffeine after lunch.    Do not have a cigarette or any other source of nicotine before bedtime, better yet, Quit smoking.  Nicotine is a stimulant. Smokers are 4 times more likely to not feel  rested.    Avoid alcohol before bedtime  A few hours after drinking, alcohol levels in your blood stream start to drop, and this can signal your body to wake up. Finish your last sip 2 hours before bedtime.     Don t take naps during the day. This will affect your circadian rhythm.    Avoid strenuous exercise within 2 hours of bedtime.  You should exercise on a regular basis, but do it earlier in the day.    Avoid sleeping pills.  Most medical prescribers only prescribe certain medications for short periods of time (no longer than 3 weeks).    Try to get rid of or deal with things that make you worry.  If you are unable to do this, then find a time during the day to get all of your worries out of your system- exercise, meditate, pray, journal writing, or just be still for 5 minutes.    Do not go to bed hungry, but don t eat a big meal near bedtime either.  If you are having disruptions at night, remove them. Keep pets out of the bedroom, have partner sleep in a different room if snoring/restless.    Be comfortable. If your mattress is old, get a new one. Mattresses should be changed every 10 years.     Keep a sleep diary to see how you are doing  You can download free apps on your phone: Pillow, Sleep better, SleepBot    Resources:  National Sleep Foundation   https://sleepfoundation.org/    National [place] of Neurological Disorders and Stroke    https://www.ninds.nih.gov/Disorders/Patient-Caregiver-Education/Understanding-Sleep    National [place] of Health- Why is sleep important?  https://www.nhlbi.nih.gov/health/health-topics/topics/sdd/why        HOW TO BE HEALTHIER    Tools to use: www.sparkpeople.com- FREE website that helps you with food choices, and exercise. You can talk with people, talk to trainers and dieticians.    Increase your water intake daily to 6 glasses     Purchase a pedometer that will monitor how many steps you take (10, 000 daily is a goal for everyone)- these are sometimes included in  smart phones.    Use meal replacements such as Janene's meals, Lean Cuisines, Healthy Choice, Smart Ones, Weight Watchers Meals, and Slim Fast and Glucerna shakes and supplement with fresh fruits and vegetables    Please drink a lot of water daily. Most people typically need about 2 liters of water daily and more if they are exercising, have a large weight, or have a fever or illness. Add Crystal Light for flavoring if desired. But no pop with calories in it.    Keep a food journal of what you eat, calories in what you eat, and mood. You will see where you are getting the majority of your calories. Bring the journal with you to your nutrition appointment (if you are being referred).    Consider using applications for smart phones such as BluePoint Securityâ„¢, Satispay, Alnylam PharmaceuticalsReciCogniSens, LoseIt, Tap&Track, and RelaxM.    Focus on wet volumetrics, meaning, eat more foods that are high in water and fiber such as fruits and vegetables in order to get that full feeling. These are also good for your overall health as well.    Check out Dr. Santa Reynaga from [facility] - she has cookbooks with low calorie volumetric recipes    You can try Let's Dish to help you prepare meals for you and your family. Often times, the caloric and nutrition data and serving sizes are available for this food. This can be a time saving maneuver. The website can give you more information http://www.Dimmi.Wise Data.Media/    Coborn's Delivers has Let's Dish & fresh low calorie salads    Check out Hello Fresh at https://www.Atraverda.Wise Data.Media/food-boxes/classic-box/    Try Cooking Light recipes for low calorie meal preparation and planning    Other food plan options you can search for on the internet and check out include: Willy DUARTE, [facility]    Here are 10 ways to get you moving more often:   1. Be less efficient. People typically try to think of ways to make daily tasks easier. But if we make them harder, we can get more exercise, says Staci Neves MS, of  Chris Gonzalez, a , , and spokeswoman for the [employer] [place] on Exercise (ACE).  Bring in the groceries from your car one bag at a time so you have to make several trips,  Edinson says.  Put the laundry away a few items at a time, rather than carrying it up in a basket.    2. Shun labor-saving devices. Wash the car by hand rather than taking it to the car wash.  It takes about an hour and a half to do a good job, and in the meantime you ve gotten great exercise,  Edinson says. Use a push mower rather than a riding mower to groom your lawn.   3. Going somewhere? Take the long way. Walking up or down a few flights of stairs each day can be good for your heart. Avoid elevators and escalators whenever possible. If you ride the bus or subway to work, get off a stop before your office and walk the extra distance. When you go to the mall or the grocery store, park furthest from the entrance, not as close to it as you can, and you'll get a few extra minutes of walking -- one of the best exercises there is, Dr. Youngblood says.  Walking is great because anyone can do it and you don t need any special equipment other than a properly fitting pair of sneakers.    4. Be a morning person. Studies show that people who exercise in the morning are more likely to stick with it. As Edinson explains,  Are you going to feel like exercising at the end of a hard day? Probably not. If you do your workout in the morning, you re not only more likely to do it, but you'll also set a positive tone for the day.    5. Ink the deal. Whether morning, afternoon, or evening, pick the time that is most convenient for you to exercise and write it down in your daily planner. Keep your exercise routine as you would keep any appointment.   6. Watch your step. Investing in a good pedometer can help you stay motivated.  If you have a pedometer attached to your waist and you can see how many steps you ve  taken, you ll see it doesn t take long to walk 5,000 steps and you will be inspired,  Edinson says. And building up to 10,000 steps a day won t seem like such a daunting a task.   7. Hire the right help. While weight training is important, if you don t know what you re doing, you run the risk of injuring yourself or not being effective, Edinson says. It s best to get instructions from a  at the gym. You also can buy a weight-training DVD and follow along in your living room.   8. Keep records. Grab a diary or logbook, and every day that you exercise, write down what you did and for how long. Your records will make it easy for you to see what you ve accomplished and make you more accountable. Blank pages? You d be ashamed.   9. Phone a friend. Find someone who likes the same activity that you do -- walking in the neighborhood, riding bikes, playing tennis -- and make a date to do it together.  Exercising with a friend or in a group can be very motivating,  Ford says.  You are likely to walk longer or bike greater distances if you re talking to a friend along the way. The time will go by faster.  Don t have a morgan who is available? Grab an MP3 player and listen to your favorite music or an audio book while exercising.   10. Do what you like. Whatever exercise you choose, be sure it s one that you enjoy. You re more likely to stick with it if it s something you have fun doing rather than something you see as a chore, Ford says.   If you can t fit 40 minutes a day into your schedule, get more exercise simply by being less efficient with your chores and adding a little extra walking distance everywhere you go. However, if you pick an activity you like, finding time for fitness will become effortless and the rewards enormous.   BOOKS ON WEIGHT LOSS  A course in Weight Loss by Earnest Barrow    Eat, Drink, and Weigh Less by [place] researcher Jamal Gaona, and Carmen Odom    Ultraabolism  by Pollo Zamora. Recommend keeping Carbohydrates to  30-45g/meal and 15g/snack with fiber of at least 4g/serving, protein goal of 60-90g/day. Keep food diary on myfitnesspal.com. Recommend pedometer with ultimate goal of 10,000 steps a day.     The Willpower Instinct by Silvia South.    Finding Your Ideal Weight  Most of the people in magazines and on TV are far slimmer than average, yet this is the  ideal  that many people aim for. Before you decide that you won t be happy until you get down to a certain number of pounds, consider:      Your age. You probably wish you could get back to your college weight. But normal changes in metabolism and hormone levels that occur with aging make it unrealistic.    Your gender. In general, men have more muscle and heavier bones than women, which means that healthy men usually weigh more than healthy women of the same height.    Your current weight. If you are very heavy, focus on losing a smaller amount (such as 10 percent of your body weight). Losing just 5 to 10 pounds can improve your health.  Your Body Fat Percentage  A pound of muscle weighs the same as a pound of fat, but it takes up less space. Think of a trained athlete and a  couch potato.  Even though they may be the same height and weight, the athlete looks fitter, is healthier, and probably wears a smaller size of clothing. If you are muscular, a body fat test may be a more accurate measure of your ideal weight than the bathroom scale. Talk to your healthcare provider, who can help you set appropriate goals for yourself.    Weight Management: Healthy Eating  Food is your body s fuel. You can t live without it. The key is to give your body enough nutrients and energy without eating too much. Reading food labels can help you make healthy choices. Also, learn new eating habits to manage your weight.      All the values on the label are based on one serving. The serving size is the average portion. Remember to multiply  the values on the label by the number of servings you eat.   Eat Less Fat  A gram of fat has almost twice the calories of a gram of protein or carbohydrates. Try to balance your food choices so that 20% to 35% of your calories comes from total fat. This means an average of 2  to 3  grams of fat for each 100 calories you eat.  Eat More Fiber  High-fiber foods are digested more slowly than low-fiber foods, so you feel full longer. Try to get 31 grams of fiber each day. Foods high in fiber include:    Vegetables and fruits    Whole-grain or bran breads, pastas, and cereals    Legumes (beans) and peas  As you begin to eat more fiber, be sure to drink plenty of water to keep your digestive system working smoothly.  Tips    Don t skip meals. This often leads to overeating later on. It s best to spread your eating throughout the day.    Eat a variety of foods, not just a few favorites.    If you find yourself eating when you re not hungry, ask yourself why. Many of us eat when we re bored, stressed, or just to be polite. Listen to your body. If you re not hungry, get busy doing something else instead of eating.    Eat slower. It takes 20 minutes for your stomach to tell your brain that it s full.    Pay attention to what you eat. Don t read or watch TV during your meal.      Weight Management: Exercise and Activity  Goal: at least 40 minutes daily  Studies show that people who exercise are the most likely to lose weight and keep it off. Exercise burns calories. It helps build muscle to make your body stronger. Make exercise part of your weight-management plan. You must hit moderate- to high cardiovascular workout for weight loss. Get your heart rate up to 75% to help you burn fat. Check online for free calculators.  Make Activity Part of Your Day  You may not think you have the time to exercise. But you can work activity into your daily life. Take 10 minutes out of your lunch hour to take a walk. Walk to the newsstand to  get your paper instead of having it delivered. Make it a habit to take the stairs instead of the elevator. Park in the farthest parking spot instead of the closest. You ll be surprised at how fast these little changes can make a difference.  The Benefits of Exercise    Exercise increases your metabolism (the speed at which your body burns calories).    Regular exercise can increase the amount of muscle in your body. Muscle burns calories faster than fat. The more muscle you have, the more calories you burn.    Exercise gives you energy and curbs your appetite.    Exercise decreases stress and helps you sleep better.  Make Exercise Fun  Exercise can be fun. Choose an activity you enjoy. You may even get a friend to do it with you.    Take a resistance-training or aerobics class    Join a team sport    Take a dance class    Walk the dog    Ride a bike  If you have health problems, be sure to ask your doctor before you start an exercise program. Have a  help you develop a plan that s safe for you.     7297[address]52 [place] [address]. All rights reserved. This information is not intended as a substitute for professional medical care. Always follow your healthcare professional's instructions.    OTHER AVENUES TO INCREASE PHYSICAL FITNESS    Mapping Walks and Runs, biking   www.mapmyrun.com   www.mapmybike.com    Rating walkability of a neighborhood   www.Massive Solutions    Treadmill Work Stations   www.enercast    Free Workouts at home   www.The Xmap Inc..Revivn    Walking, hiking, biking trails   www.ralistotrails.com    Active volunteer opportunities   www.GreenGoose!.org    Race or walk/run events in your area   www.active.come    Workoutmusic    www.Stukent/itmilabent/   www.Celiro.[facility]stein Lab    Adventure Vacations   www.Autobutler    Heart rate Monitors   www.polarusa.com    Pedometers   www.Fundability.[place] Lab    Outdoor treasure hunts on your  own   www.The Nature Conservancy    7 Worst Junk Ingredients to Avoid   (If you don't know what it is, it probably isn't good to eat it!)  1. Sodium Nitrate (also called Sodium Nitrite)  This is a preservative, coloring, and flavoring commonly found in processed meats like bryant, ham, hot dogs, cold cuts and smoked fish. Studies have shown that it reacts with the body s digestive acids to form a cancer-causing agent called nitrosamines. So double-check that  healthy  turkey for carcinogens before you gobble down your sandwich!  2. Aspartame (aka NutraSweet/Equal)  In scientific terms, this is a chemical combination of two amino acids and methanol. It s better known by the brand names NutraSweet and Equal, which are sweeteners found in countless  diet  desserts, drink mixes, and soft drinks. Aspartame was once thought to be a safe artificial sweetener, but it is now believed to cause cancer and neurological problems such as dizziness and hallucinations.  3. Acesulfame-K  This artificial sweetener is 200 times sweeter than sugar and is often found in chewing gum and soft drinks. When tested in the laboratory, it caused cancer in rats. And that makes this additive a lot less sweet in our opinion!  4. Artificial Food Colorings  There are food colorings being used that are linked with cancer in animal testing as well as behavioral disorders in children. These include Yellow 5, Red 40, Blue 1, Blue 2, Green 3, Orange B, Red 3 and Yellow 6. Amazingly, these colors have been banned in the [place] yet remain in many American foods. They can easily be avoided by choosing natural foods that aren t chemically or colorfully enhanced.  5. MSG  Monosodium Glutamate (MSG) is an amino acid used as a flavor enhancer in many soups, salad dressings, chips, frozen entrees and restaurant food. This nasty additive can alejandro with the nervous system causing side effects like migraines and overeating in some individuals. MSG appears on  labels under several aliases, including yeast extract and calcium caseinate. It s even been found on the labels of organic products! Here s a list of the common aliases for MSG.    Monosodium Glutamate    Hydrolyzed Vegetable Protein    Hydrolyzed Protein    Hydrolyzed Plant Protein    Plant Protein Extract    Sodium Caseinate    Calcium Caseinate    Yeast Extract    Textured Protein    Autolyzed Yeast    Hydrolyzed Oat Flour    6. Trans Fats  Trans fats cause heart disease. It s a proven fact. Before purchasing any packaged food, check the ingredients list. Even if the label boasts  0g trans fats  BEWARE the product may still contain up to a 0.5g of trans fats per serving, if you see the words partially hydrogenated oils on the ingredients list. It s important to avoid even the smallest amount because it can raise your bad cholesterol and lower your good cholesterol, making you susceptible to all kinds of health problems!  7. Sodium Benzoate  Sodium benzoate is a preservative used in many foods and beverages. This ingredient is known to cause hives, asthma and other allergic reactions in sensitive individuals. New research shows that it may also cause behavioral disorders in children. One more reason to avoid this harmful ingredient: When used in beverages that also contain ascorbic acid (vitamin C) it forms benzene, a known carcinogen. Some drink manufacturers are still using this toxic duo, so you may have benzene lurking in your favorite drink!            Preventive Health Recommendations  Male Ages 26 - 39    Yearly exam:             See your health care provider every year in order to  o   Review health changes.   o   Discuss preventive care.    o   Review your medicines if your doctor has prescribed any.    You should be tested each year for STDs (sexually transmitted diseases), if you re at risk.     After age 35, talk to your provider about cholesterol testing. If you are at risk for heart disease, have  "your cholesterol tested at least every 5 years.     If you are at risk for diabetes, you should have a diabetes test (fasting glucose).  Shots: Get a flu shot each year. Get a tetanus shot every 10 years.     Nutrition:    Eat at least 5 servings of fruits and vegetables daily.     Eat whole-grain bread, whole-wheat pasta and brown rice instead of white grains and rice.     Get adequate Calcium and Vitamin D.     Lifestyle    Exercise for at least 150 minutes a week (30 minutes a day, 5 days a week). This will help you control your weight and prevent disease.     Limit alcohol to one drink per day.     No smoking.     Wear sunscreen to prevent skin cancer.     See your dentist every six months for an exam and cleaning.             Follow-ups after your visit        Who to contact     If you have questions or need follow up information about today's clinic visit or your schedule please contact [facility] directly at [phone].  Normal or non-critical lab and imaging results will be communicated to you by eSparkhart, letter or phone within 4 business days after the clinic has received the results. If you do not hear from us within 7 days, please contact the clinic through eSparkhart or phone. If you have a critical or abnormal lab result, we will notify you by phone as soon as possible.  Submit refill requests through [facility] or call your pharmacy and they will forward the refill request to us. Please allow 3 business days for your refill to be completed.          Additional Information About Your Visit        [facility] Information     [facility] lets you send messages to your doctor, view your test results, renew your prescriptions, schedule appointments and more. To sign up, go to [email or web address] . Click on \"Log in\" on the left side of the screen, which will take you to the Welcome page. Then click on \"Sign up Now\" on the right side of the page.     You will be asked to enter the access code listed " "below, as well as some personal information. Please follow the directions to create your username and password.     Your access code is: [email or web address]  Expires: 2019  3:05 PM     Your access code will  in 90 days. If you need help or a new code, please call your [place] clinic or [phone].        Care EveryWhere ID     This is your Care EveryWhere ID. This could be used by other organizations to access your [place] medical records  [id number]        Your Vitals Were     Pulse Temperature Respirations Height Pulse Oximetry BMI (Body Mass Index)    72 98.2  F (36.8  C) (Tympanic) 12 5' 7.5\" (1.715 m) 100% 31.17 kg/m2       Blood Pressure from Last 3 Encounters:   18 120/62   17 110/68   16 120/76    Weight from Last 3 Encounters:   18 202 lb (91.6 kg)   17 181 lb (82.1 kg)   16 187 lb (84.8 kg)              We Performed the Following     CBC with platelets     Comprehensive metabolic panel     EKG [facility] READ     Glucose, whole blood     PSA, screen     TSH with free T4 reflex          Today's Medication Changes          These changes are accurate as of 18  3:05 PM.  If you have any questions, ask your nurse or doctor.               Stop taking these medicines if you haven't already. Please contact your care team if you have questions.     VITAMIN B 12 PO   Stopped by:  Mari Galvan, PEDRO PABLO                    Primary Care Provider Office Phone # Fax #    Reno Xie [phone] [address] [address]        Equal Access to Services     [facility]: Hadii alyssa Ivey, wamorrisda óscar, qayben hunter . So [facility] [phone].    ATENCIÓN: Si habla español, tiene a guerra disposición servicios gratuitos de asistencia lingüística. Llame al [phone].    We comply with applicable federal civil rights laws and [place] laws. We do not " discriminate on the basis of race, color, national origin, age, disability, sex, sexual orientation, or gender identity.            Thank you!     Thank you for choosing [facility]  for your care. Our goal is always to provide you with excellent care. Hearing back from our patients is one way we can continue to improve our services. Please take a few minutes to complete the written survey that you may receive in the mail after your visit with us. Thank you!             Your Updated Medication List - Protect others around you: Learn how to safely use, store and throw away your medicines at www.disposemymeds.org.          This list is accurate as of 11/1/18  3:05 PM.  Always use your most recent med list.                   Brand Name Dispense Instructions for use Diagnosis    ALEVE 220 MG tablet   Generic drug:  naproxen sodium      Take 1 tablet by mouth daily as needed        B COMPLEX PO           FIBER CHOICE PO           glucosamine-chondroitin 500-400 MG Caps per capsule      Take 1 capsule by mouth daily        MAGNESIUM OXIDE PO           Multi-vitamin Tabs tablet      Take 1 tablet by mouth daily        VITAMIN C PO           VITAMIN D3 PO      Take by mouth daily

## 2018-11-01 NOTE — PATIENT INSTRUCTIONS
1. Routine history and physical examination of adult  Screening  - TSH with free T4 reflex  - Glucose, whole blood  Recheck with me in 1 month    2. Sleep concern  Chronic,stable  Stop using alcohol to sleep  Follow sleep hygiene tips. Recheck in a month with Dr. Xie  Melatonin 3 mg 2 hours before sleep (Nature Made brand, or Morfin)  Tylenol or Ibuprofen an hour before bed  Push fluids during the day- water!!!  Reduce Mtn Dew consumption    3. BMI 31.0-31.9,adult  Chronic, worsened  Increase physical activity- goal 10 lbs weight loss  See below  Wt Readings from Last 10 Encounters:   11/01/18 202 lb (91.6 kg)   01/11/17 181 lb (82.1 kg)   08/05/16 187 lb (84.8 kg)   06/15/16 182 lb 3.2 oz (82.6 kg)   08/08/13 259 lb (117.5 kg)   08/30/12 255 lb (115.7 kg)   05/21/12 255 lb 3.2 oz (115.8 kg)     4. Chest pain, unspecified type  Chronic, stable  - EKG [facility]  - CBC with platelets  - Comprehensive metabolic panel    5. Incomplete bladder emptying  Chronic, stable  - PSA, screen    6. Vision changes  Acute  Recheck with eye specialist    Here are some tips for how you can improve your sleep hygiene:    Don't go to bed unless you are sleepy.   If you are not sleepy at bedtime, then do something else.   Read a book, listen to soft music or browse through a magazine. Find something relaxing, but not stimulating, to take your mind off of worries about sleep. This will relax your body and distract your mind.    Make your bedroom an oasis of peace.  It should be a little bit cool (ideal temperature 65-75 degrees Fahrenheit), dark (blackout shades), quiet (use hearing aids), aromatherapy (lavender, chamomile, ylang-ylang activate the alpha wave activity in your brain which improves relaxation) comfortable sheets/blankets/pillow- you might need new ones. Check to make sure you have a comfortable mattress (they should be changed every 10 years). Consider kicking your pets to the curb (more than half people who  sleep with pets note disturbed sleep). Don t open bills, or do work in your bedroom.    If you are not asleep after 20 minutes, then get out of bed.  Find something else to do that will make you feel relaxed. If you can, do this in another room. Your bedroom should be where you go to sleep. It is not a place to go when you are bored. Once you feel sleepy again, go back to bed.    Follow a sleep ritual to help you relax each night before bed.  This can include such things as a warm bath, light snack, listen to a meditation elvira, or read for a few minutes. Don t make your  to do  lists before bed.    Set a sleep schedule.   Wake-up at the same time daily.Do this even on weekends and holidays. This keeps your circadian rhythm steady.    Keep a regular schedule  Regular times for meals, medications, chores, and other activities help keep the inner body check running smoothly    Don t read, write, eat, watch TV, talk on the phone, or use your mobile device 1 hour before in bed.  Reduce stimulation and your exposure to blue and white light given off by phones/laptops/iPads and other devices prevent our brains from releasing melatonin (a hormone that tells our bodies it s nighttime).    Don t have caffeine after lunch.    Do not have a cigarette or any other source of nicotine before bedtime, better yet, Quit smoking.  Nicotine is a stimulant. Smokers are 4 times more likely to not feel rested.    Avoid alcohol before bedtime  A few hours after drinking, alcohol levels in your blood stream start to drop, and this can signal your body to wake up. Finish your last sip 2 hours before bedtime.     Don t take naps during the day. This will affect your circadian rhythm.    Avoid strenuous exercise within 2 hours of bedtime.  You should exercise on a regular basis, but do it earlier in the day.    Avoid sleeping pills.  Most medical prescribers only prescribe certain medications for short periods of time (no longer than 3  weeks).    Try to get rid of or deal with things that make you worry.  If you are unable to do this, then find a time during the day to get all of your worries out of your system- exercise, meditate, pray, journal writing, or just be still for 5 minutes.    Do not go to bed hungry, but don t eat a big meal near bedtime either.  If you are having disruptions at night, remove them. Keep pets out of the bedroom, have partner sleep in a different room if snoring/restless.    Be comfortable. If your mattress is old, get a new one. Mattresses should be changed every 10 years.     Keep a sleep diary to see how you are doing  You can download free apps on your phone: Pillow, Sleep better, SleepBot    Resources:  National Sleep Foundation   https://sleepfoundation.org/    National Warrior of Neurological Disorders and Stroke    https://www.ninds.nih.gov/Disorders/Patient-Caregiver-Education/Understanding-Sleep    National Warrior of Health- Why is sleep important?  https://www.nhlbi.nih.gov/health/health-topics/topics/sdd/why        HOW TO BE HEALTHIER    Tools to use: www.sparkpeople.com- FREE website that helps you with food choices, and exercise. You can talk with people, talk to trainers and dieticians.    Increase your water intake daily to 6 glasses     Purchase a pedometer that will monitor how many steps you take (10, 000 daily is a goal for everyone)- these are sometimes included in smart phones.    Use meal replacements such as Janene's meals, Lean Cuisines, Healthy Choice, Smart Ones, Weight Watchers Meals, and Slim Fast and Glucerna shakes and supplement with fresh fruits and vegetables    Please drink a lot of water daily. Most people typically need about 2 liters of water daily and more if they are exercising, have a large weight, or have a fever or illness. Add Crystal Light for flavoring if desired. But no pop with calories in it.    Keep a food journal of what you eat, calories in what you eat, and mood.  You will see where you are getting the majority of your calories. Bring the journal with you to your nutrition appointment (if you are being referred).    Consider using applications for smart phones such as HealthFleet.com, The Tap Lab, Coupon WalletReciSeeControl, LoseIt, Tap&Track, and RelaxM.    Focus on wet volumetrics, meaning, eat more foods that are high in water and fiber such as fruits and vegetables in order to get that full feeling. These are also good for your overall health as well.    Check out Dr. Santa Reynaga from [facility] - she has cookbooks with low calorie volumetric recipes    You can try Let's Dish to help you prepare meals for you and your family. Often times, the caloric and nutrition data and serving sizes are available for this food. This can be a time saving maneuver. The website can give you more information http://www.MetaCure/    Coborn[employer]ivers has Let's Dish & fresh low calorie salads    Check out Hello Fresh at https://www.CapRally/food-boxes/classic-box/    Try Cooking Light recipes for low calorie meal preparation and planning    Other food plan options you can search for on the internet and check out include: Willy DUARTE, [facility]    Here are 10 ways to get you moving more often:   1. Be less efficient. People typically try to think of ways to make daily tasks easier. But if we make them harder, we can get more exercise, says Staci Neves MS, of Chris Gonzalez, a , , and spokeswoman for the [employer] [place] on Exercise (ACE).  Bring in the groceries from your car one bag at a time so you have to make several trips,  Edinson says.  Put the laundry away a few items at a time, rather than carrying it up in a basket.    2. Shun labor-saving devices. Wash the car by hand rather than taking it to the car wash.  It takes about an hour and a half to do a good job, and in the meantime you ve gotten great exercise,  Edisnon says. Use a  push mower rather than a riding mower to groom your lawn.   3. Going somewhere? Take the long way. Walking up or down a few flights of stairs each day can be good for your heart. Avoid elevators and escalators whenever possible. If you ride the bus or subway to work, get off a stop before your office and walk the extra distance. When you go to the mall or the grocery store, park furthest from the entrance, not as close to it as you can, and you'll get a few extra minutes of walking -- one of the best exercises there is, Dr. Youngblood says.  Walking is great because anyone can do it and you don t need any special equipment other than a properly fitting pair of sneakers.    4. Be a morning person. Studies show that people who exercise in the morning are more likely to stick with it. As Edinson explains,  Are you going to feel like exercising at the end of a hard day? Probably not. If you do your workout in the morning, you re not only more likely to do it, but you'll also set a positive tone for the day.    5. Ink the deal. Whether morning, afternoon, or evening, pick the time that is most convenient for you to exercise and write it down in your daily planner. Keep your exercise routine as you would keep any appointment.   6. Watch your step. Investing in a good pedometer can help you stay motivated.  If you have a pedometer attached to your waist and you can see how many steps you ve taken, you ll see it doesn t take long to walk 5,000 steps and you will be inspired,  Edinson says. And building up to 10,000 steps a day won t seem like such a daunting a task.   7. Hire the right help. While weight training is important, if you don t know what you re doing, you run the risk of injuring yourself or not being effective, Edinson says. It s best to get instructions from a  at the gym. You also can buy a weight-training DVD and follow along in your living room.   8. Keep records. Grab a diary or logbook, and  every day that you exercise, write down what you did and for how long. Your records will make it easy for you to see what you ve accomplished and make you more accountable. Blank pages? You d be ashamed.   9. Phone a friend. Find someone who likes the same activity that you do -- walking in the neighborhood, riding bikes, playing tennis -- and make a date to do it together.  Exercising with a friend or in a group can be very motivating,  Ford says.  You are likely to walk longer or bike greater distances if you re talking to a friend along the way. The time will go by faster.  Don t have a morgan who is available? Grab an MP3 player and listen to your favorite music or an audio book while exercising.   10. Do what you like. Whatever exercise you choose, be sure it s one that you enjoy. You re more likely to stick with it if it s something you have fun doing rather than something you see as a chore, Ford says.   If you can t fit 40 minutes a day into your schedule, get more exercise simply by being less efficient with your chores and adding a little extra walking distance everywhere you go. However, if you pick an activity you like, finding time for fitness will become effortless and the rewards enormous.   BOOKS ON WEIGHT LOSS  A course in Weight Loss by Earnest Barrow    Eat, Drink, and Weigh Less by [place] researcher Jamal Gaona, and Carmen Odom    Ultrametabolism by Pollo Zamora. Recommend keeping Carbohydrates to  30-45g/meal and 15g/snack with fiber of at least 4g/serving, protein goal of 60-90g/day. Keep food diary on myfitnesspal.com. Recommend pedometer with ultimate goal of 10,000 steps a day.     The Willpower Instinct by Silvia South.    Finding Your Ideal Weight  Most of the people in magazines and on TV are far slimmer than average, yet this is the  ideal  that many people aim for. Before you decide that you won t be happy until you get down to a certain number of pounds,  consider:      Your age. You probably wish you could get back to your college weight. But normal changes in metabolism and hormone levels that occur with aging make it unrealistic.    Your gender. In general, men have more muscle and heavier bones than women, which means that healthy men usually weigh more than healthy women of the same height.    Your current weight. If you are very heavy, focus on losing a smaller amount (such as 10 percent of your body weight). Losing just 5 to 10 pounds can improve your health.  Your Body Fat Percentage  A pound of muscle weighs the same as a pound of fat, but it takes up less space. Think of a trained athlete and a  couch potato.  Even though they may be the same height and weight, the athlete looks fitter, is healthier, and probably wears a smaller size of clothing. If you are muscular, a body fat test may be a more accurate measure of your ideal weight than the bathroom scale. Talk to your healthcare provider, who can help you set appropriate goals for yourself.    Weight Management: Healthy Eating  Food is your body s fuel. You can t live without it. The key is to give your body enough nutrients and energy without eating too much. Reading food labels can help you make healthy choices. Also, learn new eating habits to manage your weight.      All the values on the label are based on one serving. The serving size is the average portion. Remember to multiply the values on the label by the number of servings you eat.   Eat Less Fat  A gram of fat has almost twice the calories of a gram of protein or carbohydrates. Try to balance your food choices so that 20% to 35% of your calories comes from total fat. This means an average of 2  to 3  grams of fat for each 100 calories you eat.  Eat More Fiber  High-fiber foods are digested more slowly than low-fiber foods, so you feel full longer. Try to get 31 grams of fiber each day. Foods high in fiber include:    Vegetables and  fruits    Whole-grain or bran breads, pastas, and cereals    Legumes (beans) and peas  As you begin to eat more fiber, be sure to drink plenty of water to keep your digestive system working smoothly.  Tips    Don t skip meals. This often leads to overeating later on. It s best to spread your eating throughout the day.    Eat a variety of foods, not just a few favorites.    If you find yourself eating when you re not hungry, ask yourself why. Many of us eat when we re bored, stressed, or just to be polite. Listen to your body. If you re not hungry, get busy doing something else instead of eating.    Eat slower. It takes 20 minutes for your stomach to tell your brain that it s full.    Pay attention to what you eat. Don t read or watch TV during your meal.      Weight Management: Exercise and Activity  Goal: at least 40 minutes daily  Studies show that people who exercise are the most likely to lose weight and keep it off. Exercise burns calories. It helps build muscle to make your body stronger. Make exercise part of your weight-management plan. You must hit moderate- to high cardiovascular workout for weight loss. Get your heart rate up to 75% to help you burn fat. Check online for free calculators.  Make Activity Part of Your Day  You may not think you have the time to exercise. But you can work activity into your daily life. Take 10 minutes out of your lunch hour to take a walk. Walk to the newsstand to get your paper instead of having it delivered. Make it a habit to take the stairs instead of the elevator. Park in the farthest parking spot instead of the closest. You ll be surprised at how fast these little changes can make a difference.  The Benefits of Exercise    Exercise increases your metabolism (the speed at which your body burns calories).    Regular exercise can increase the amount of muscle in your body. Muscle burns calories faster than fat. The more muscle you have, the more calories you  burn.    Exercise gives you energy and curbs your appetite.    Exercise decreases stress and helps you sleep better.  Make Exercise Fun  Exercise can be fun. Choose an activity you enjoy. You may even get a friend to do it with you.    Take a resistance-training or aerobics class    Join a team sport    Take a dance class    Walk the dog    Ride a bike  If you have health problems, be sure to ask your doctor before you start an exercise program. Have a  help you develop a plan that s safe for you.     8709-2590 Yoel [facility], [address] [place] [address], [place] [address]. All rights reserved. This information is not intended as a substitute for professional medical care. Always follow your healthcare professional's instructions.    OTHER AVENUES TO INCREASE PHYSICAL FITNESS    Mapping Walks and Runs, biking   www.mapmyrun.com   www.mapmybike.com    Rating walkability of a neighborhood   www.Gamzoo Media    Treadmill Work Stations   www.H2Mob    Free Workouts at home   www.ROX Medical    Walking, hiking, biking trails   www.ralistotrails.com    Active volunteer opportunities   www.[place]    Race or walk/run events in your area   www.active.[employer]    WorkoutmobME Solutions    www.[place] World/Carbon Analytics/   www.MoneyFarm    Adventure Vacations   www.Timely    Heart rate Monitors   www.polarusa.com    Pedometers   www.Silicon Frontline Technology.Neo Technology    Outdoor treasure hunts on your own   www.LineRate Systems    7 Worst Junk Ingredients to Avoid   (If you don't know what it is, it probably isn't good to eat it!)  1. Sodium Nitrate (also called Sodium Nitrite)  This is a preservative, coloring, and flavoring commonly found in processed meats like bryant, ham, hot dogs, cold cuts and smoked fish. Studies have shown that it reacts with the body s digestive acids to form a cancer-causing agent called nitrosamines. So double-check that  healthy  turkey for carcinogens before you gobble down your  sandwich!  2. Aspartame (aka NutraSweet/Equal)  In scientific terms, this is a chemical combination of two amino acids and methanol. It s better known by the brand names NutraSweet and Equal, which are sweeteners found in countless  diet  desserts, drink mixes, and soft drinks. Aspartame was once thought to be a safe artificial sweetener, but it is now believed to cause cancer and neurological problems such as dizziness and hallucinations.  3. Acesulfame-K  This artificial sweetener is 200 times sweeter than sugar and is often found in chewing gum and soft drinks. When tested in the laboratory, it caused cancer in rats. And that makes this additive a lot less sweet in our opinion!  4. Artificial Food Colorings  There are food colorings being used that are linked with cancer in animal testing as well as behavioral disorders in children. These include Yellow 5, Red 40, Blue 1, Blue 2, Green 3, Orange B, Red 3 and Yellow 6. Amazingly, these colors have been banned in the [place] yet remain in many American foods. They can easily be avoided by choosing natural foods that aren t chemically or colorfully enhanced.  5. MSG  Monosodium Glutamate (MSG) is an amino acid used as a flavor enhancer in many soups, salad dressings, chips, frozen entrees and restaurant food. This nasty additive can alejandro with the nervous system causing side effects like migraines and overeating in some individuals. MSG appears on labels under several aliases, including yeast extract and calcium caseinate. It s even been found on the labels of organic products! Here s a list of the common aliases for MSG.    Monosodium Glutamate    Hydrolyzed Vegetable Protein    Hydrolyzed Protein    Hydrolyzed Plant Protein    Plant Protein Extract    Sodium Caseinate    Calcium Caseinate    Yeast Extract    Textured Protein    Autolyzed Yeast    Hydrolyzed Oat Flour    6. Trans Fats  Trans fats cause heart disease. It s a proven fact. Before purchasing  any packaged food, check the ingredients list. Even if the label boasts  0g trans fats  BEWARE the product may still contain up to a 0.5g of trans fats per serving, if you see the words partially hydrogenated oils on the ingredients list. It s important to avoid even the smallest amount because it can raise your bad cholesterol and lower your good cholesterol, making you susceptible to all kinds of health problems!  7. Sodium Benzoate  Sodium benzoate is a preservative used in many foods and beverages. This ingredient is known to cause hives, asthma and other allergic reactions in sensitive individuals. New research shows that it may also cause behavioral disorders in children. One more reason to avoid this harmful ingredient: When used in beverages that also contain ascorbic acid (vitamin C) it forms benzene, a known carcinogen. Some drink manufacturers are still using this toxic duo, so you may have benzene lurking in your favorite drink!            Preventive Health Recommendations  Male Ages 26 - 39    Yearly exam:             See your health care provider every year in order to  o   Review health changes.   o   Discuss preventive care.    o   Review your medicines if your doctor has prescribed any.    You should be tested each year for STDs (sexually transmitted diseases), if you re at risk.     After age 35, talk to your provider about cholesterol testing. If you are at risk for heart disease, have your cholesterol tested at least every 5 years.     If you are at risk for diabetes, you should have a diabetes test (fasting glucose).  Shots: Get a flu shot each year. Get a tetanus shot every 10 years.     Nutrition:    Eat at least 5 servings of fruits and vegetables daily.     Eat whole-grain bread, whole-wheat pasta and brown rice instead of white grains and rice.     Get adequate Calcium and Vitamin D.     Lifestyle    Exercise for at least 150 minutes a week (30 minutes a day, 5 days a week). This will help  you control your weight and prevent disease.     Limit alcohol to one drink per day.     No smoking.     Wear sunscreen to prevent skin cancer.     See your dentist every six months for an exam and cleaning.

## 2018-11-02 NOTE — PROGRESS NOTES
TSH (thyroid)- normal  PSA (prostate)- normal  CMP- normal  CBC- normal   Glucose whole blood- normal for non-fasting  Please notify him of these results and send a hard copy if not on myChart.   Thanks. AUGUSTINE Haro

## 2019-01-02 NOTE — PROGRESS NOTES
SUBJECTIVE:   Sergio Us is a 38 year old male who presents to clinic today for the following health issues:    Urinary issue  Feels like he is dribbling a lot more now  No dysuria, feels like he empties most of his bladder  No history of urinary concern    Elevated glucose  Component      Latest Ref Rng & Units 11/1/2018   Glucose      70 - 99 mg/dL 109 (H)       SLEEP  CONCERN    Plan from 11/1/2018:  Stop using alcohol to sleep (still 1-2 drinks)  Follow sleep hygiene tips. Recheck in a month with Dr. Xie  Melatonin 3 mg 2 hours before sleep (Nature Made brand, or Morfin)  Tylenol or Ibuprofen an hour before bed  Push fluids during the day- water!!!  Reduce Mtn Dew consumption- finishing this pack      Onset: years  Description:   Time to fall asleep (sleep latency): 1 hour  Middle of night awakening:  YES- 3 nights a week   Early morning awakening:  no  Progression of Symptoms:  improving    Sleep history    Family history of sleep disorder: Unknown     Prior Insomnia: YES   Childhood Sleep Walking: no   Childhood Night Terrors: no      Accompanying Signs & Symptoms:  Still participating in activities that you used to enjoy: YES  Fatigue: YES  Irritability: YES- at times  Difficulty concentrating: YES  Changes in appetite: no  Problems with sleep: YES  Heart racing/beating fast : Unsure   Thoughts of hurting yourself or others: none   - Get PHQ9 and GAD7  Precipitating factors:   New stressful situation: no  Caffeine intake: YES- 2 mugs of coffee and 3 cans of Mountain Dew daily- finishing what he has left   OTC decongestants: no  Any new medications: no  Bruxism (grinding teeth):  YES- sometimes       Accompanying Signs & Symptoms:  Daytime sleepiness/napping: YES- no napping  Excessive snoring/apnea: no  Restlessness/acting out/yelling: no  Restless legs: no  Frequent urination: no  Nighttime pain:  YES- sometimes due to headaches     Sleep practice currently   Bedtime: 11:30-12:00- most nights  doesn't wake up   Wake-up: 7:30   Total hours of sleep: 8-9   Does your sleep schedule change on weekends?: YES- extra 30-60 minutes    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 Recheck in a month   Melatonin 3 mg 2 hours before sleep (Nature Made brand, or Morfin)  Tylenol or Ibuprofen an hour before bed  Push fluids during the day- water!!!  Reduce Mtn Dew consumption- finishing this pack and won't buy more  Follow Sleep hygiene handout- working on this    PCP:  Physician No Ref-Primary None  Health Maintenance        Health Maintenance Due   Topic Date Due     HIV SCREEN (SYSTEM ASSIGNED)  04/26/2000     INFLUENZA VACCINE (1) 09/01/2018       HPI        Patient Active Problem List   Diagnosis     Family history of diabetes mellitus     Family history of thyroid disorder     CARDIOVASCULAR SCREENING; LDL GOAL LESS THAN 130     BMI 31.0-31.9,adult     ACP (advance care planning)     Patient is Confucianism     Current Outpatient Medications   Medication     Ascorbic Acid (VITAMIN C PO)     B Complex Vitamins (B COMPLEX PO)     Cholecalciferol (VITAMIN D3 PO)     glucosamine-chondroitin 500-400 MG CAPS     Inulin (FIBER CHOICE PO)     MAGNESIUM OXIDE PO     multivitamin, therapeutic with minerals (MULTI-VITAMIN) TABS     naproxen sodium (ALEVE) 220 MG tablet     No current facility-administered medications for this visit.        Reviewed and  "updated:  Tobacco  Allergies  Meds  Med Hx  Surg Hx  Fam Hx  Soc Hx     ROS:  Constitutional, HEENT, cardiovascular, pulmonary, gi and gu systems are negative, except as otherwise noted.    PHYSICAL EXAM   /60 (BP Location: Right arm, Patient Position: Sitting, Cuff Size: Adult Large)   Pulse 80   Temp 98.2  F (36.8  C) (Tympanic)   Resp 20   Ht 1.715 m (5' 7.5\")   Wt 94.3 kg (208 lb)   BMI 32.10 kg/m    Body mass index is 32.1 kg/m .  GENERAL APPEARANCE: healthy, alert and no distress  RESP: lungs clear to auscultation - no rales, rhonchi or wheezes  CV: regular rates and rhythm, normal S1 S2, no S3 or S4 and no murmur, click or rub  MS: extremities normal- no gross deformities noted  PSYCH: mentation appears normal and affect normal/bright    ASSESSMENT & PLAN     1. Elevated blood sugar  Historical  - Hemoglobin A1c    2. Dribbling of urine  Chronic, stable  - UROLOGY ADULT REFERRAL    3. Sleep concern  Chronic, stable  Continue with sleep hygiene  Consider reducing hours of sleep  Check up in 1-2 months via telephone      Patient Education     Treating Urinary Incontinence in Men    You can t always control the release of urine. You may leak urine. Or you may not be able to hold your urine until you can get to a bathroom. This is called urinary incontinence. The problem can be managed. Talk to your doctor about your treatment options.  Taking medicines  Prescription medicine may help you. They may help:    The sphincter to work better (this is the muscle that closes to keep urine from leaking out of the bladder)    Stop the bladder from virgil too often to push urine out    The bladder muscles contract with more force    Relax the sphincter muscle and allow urine to flow more freely  Making changes to your routine  Certain changes in your daily routine may help. These include:    Avoiding caffeine and alcohol    Using timed voiding (this is following a schedule for drinking fluids and " urinating)    Doing Kegel exercises daily (these exercises involve tightening the muscles in your sphincter and around your bladder to help strengthen them)  Using a catheter  A catheter is a narrow tube that is inserted through the urethra into the bladder. It drains urine. A condom catheter covers the penis. It channels urine into a collection bag. It is worn most of the time. Intermittent catheterization means inserting a catheter to drain the bladder, then removing it. This is done on a regular schedule.  Special therapies    Biofeedback. This technique is taught by a nurse or physical therapist. During the therapy, a small sensor is placed inside or just outside the anus. Another sensor is placed on your stomach. these sensors read signals from the pelvic floor muscles. When you contract or relax your muscles, these signals are show as images on a computer screen. Using the images, you can learn to relax or contract certain muscles. This can help you better control these muscles. And, it can help you learn pelvic floor muscle exercises.    Electrical stimulation. This is a painless therapy that uses a tiny amount of electrical current. It helps strengthen very weak or damaged pelvic floor muscles. The electric current is sent through the muscles of the pelvic floor and bladder. This causes the muscles to contract. In time, this helps make the muscles stronger.    Stimulator implants. This technique is used to treat urge incontinence. A small device is implanted under the skin near the stomach. This device gives off mild electrical signals. These block extra signals that are being sent to the bladder muscles. This helps the bladder work more normally.  Having surgery  If other options don t work, surgery may be recommended. If surgery is an option, your healthcare provider can discuss it with you and explain its risks and benefits.  Healing after prostate surgery  Surgery on the prostate gland can cause  incontinence. Most often, the incontinence is only for a short time. It clears up when healing is complete. Rarely, prostate surgery can result in permanent incontinence.   Date Last Reviewed: 1/1/2017 2000-2018 [employer]. [address] [place] [address], [place] [address]. All rights reserved. This information is not intended as a substitute for professional medical care. Always follow your healthcare professional's instructions.             Risks, benefits, side effects and rationale for treatment plan fully discussed with the patient and understanding expressed.    AUGUSTINE Mast-BC  [facility]

## 2019-01-03 ENCOUNTER — OFFICE VISIT (OUTPATIENT)
Dept: FAMILY MEDICINE | Facility: CLINIC | Age: 39
End: 2019-01-03
Payer: COMMERCIAL

## 2019-01-03 VITALS
HEIGHT: 68 IN | RESPIRATION RATE: 20 BRPM | TEMPERATURE: 98.2 F | WEIGHT: 208 LBS | DIASTOLIC BLOOD PRESSURE: 60 MMHG | SYSTOLIC BLOOD PRESSURE: 108 MMHG | BODY MASS INDEX: 31.52 KG/M2 | HEART RATE: 80 BPM

## 2019-01-03 DIAGNOSIS — R73.9 ELEVATED BLOOD SUGAR: Primary | ICD-10-CM

## 2019-01-03 DIAGNOSIS — Z76.89 SLEEP CONCERN: ICD-10-CM

## 2019-01-03 DIAGNOSIS — N39.43 DRIBBLING OF URINE: ICD-10-CM

## 2019-01-03 LAB — HBA1C MFR BLD: 5.1 % (ref 0–5.6)

## 2019-01-03 PROCEDURE — 36415 COLL VENOUS BLD VENIPUNCTURE: CPT | Performed by: NURSE PRACTITIONER

## 2019-01-03 PROCEDURE — 99214 OFFICE O/P EST MOD 30 MIN: CPT | Performed by: NURSE PRACTITIONER

## 2019-01-03 PROCEDURE — 83036 HEMOGLOBIN GLYCOSYLATED A1C: CPT | Performed by: NURSE PRACTITIONER

## 2019-01-03 ASSESSMENT — ANXIETY QUESTIONNAIRES
1. FEELING NERVOUS, ANXIOUS, OR ON EDGE: NOT AT ALL
6. BECOMING EASILY ANNOYED OR IRRITABLE: SEVERAL DAYS
2. NOT BEING ABLE TO STOP OR CONTROL WORRYING: NOT AT ALL
5. BEING SO RESTLESS THAT IT IS HARD TO SIT STILL: NOT AT ALL
GAD7 TOTAL SCORE: 1
3. WORRYING TOO MUCH ABOUT DIFFERENT THINGS: NOT AT ALL
7. FEELING AFRAID AS IF SOMETHING AWFUL MIGHT HAPPEN: NOT AT ALL

## 2019-01-03 ASSESSMENT — PATIENT HEALTH QUESTIONNAIRE - PHQ9
SUM OF ALL RESPONSES TO PHQ QUESTIONS 1-9: 8
5. POOR APPETITE OR OVEREATING: NOT AT ALL

## 2019-01-03 ASSESSMENT — MIFFLIN-ST. JEOR: SCORE: 1840.04

## 2019-01-03 NOTE — NURSING NOTE
"Chief Complaint   Patient presents with     Sleep Problem       Initial /60 (BP Location: Right arm, Patient Position: Sitting, Cuff Size: Adult Large)   Pulse 80   Temp 98.2  F (36.8  C) (Tympanic)   Resp 20   Ht 1.715 m (5' 7.5\")   Wt 94.3 kg (208 lb)   BMI 32.10 kg/m   Estimated body mass index is 32.1 kg/m  as calculated from the following:    Height as of this encounter: 1.715 m (5' 7.5\").    Weight as of this encounter: 94.3 kg (208 lb).    Patient presents to the clinic using No DME    Health Maintenance that is potentially due pending provider review:  NONE    n/a    Is there anyone who you would like to be able to receive your results? No  If yes have patient fill out TOM    "

## 2019-01-03 NOTE — PATIENT INSTRUCTIONS
1. Elevated blood sugar  Historical  - Hemoglobin A1c    2. Dribbling of urine  Chronic, stable  - UROLOGY ADULT REFERRAL    3. Sleep concern  Chronic, stable  Continue with sleep hygiene  Consider reducing hours of sleep  Check up in 1-2 months via telephone      Patient Education     Treating Urinary Incontinence in Men    You can t always control the release of urine. You may leak urine. Or you may not be able to hold your urine until you can get to a bathroom. This is called urinary incontinence. The problem can be managed. Talk to your doctor about your treatment options.  Taking medicines  Prescription medicine may help you. They may help:    The sphincter to work better (this is the muscle that closes to keep urine from leaking out of the bladder)    Stop the bladder from virgil too often to push urine out    The bladder muscles contract with more force    Relax the sphincter muscle and allow urine to flow more freely  Making changes to your routine  Certain changes in your daily routine may help. These include:    Avoiding caffeine and alcohol    Using timed voiding (this is following a schedule for drinking fluids and urinating)    Doing Kegel exercises daily (these exercises involve tightening the muscles in your sphincter and around your bladder to help strengthen them)  Using a catheter  A catheter is a narrow tube that is inserted through the urethra into the bladder. It drains urine. A condom catheter covers the penis. It channels urine into a collection bag. It is worn most of the time. Intermittent catheterization means inserting a catheter to drain the bladder, then removing it. This is done on a regular schedule.  Special therapies    Biofeedback. This technique is taught by a nurse or physical therapist. During the therapy, a small sensor is placed inside or just outside the anus. Another sensor is placed on your stomach. these sensors read signals from the pelvic floor muscles. When you  contract or relax your muscles, these signals are show as images on a computer screen. Using the images, you can learn to relax or contract certain muscles. This can help you better control these muscles. And, it can help you learn pelvic floor muscle exercises.    Electrical stimulation. This is a painless therapy that uses a tiny amount of electrical current. It helps strengthen very weak or damaged pelvic floor muscles. The electric current is sent through the muscles of the pelvic floor and bladder. This causes the muscles to contract. In time, this helps make the muscles stronger.    Stimulator implants. This technique is used to treat urge incontinence. A small device is implanted under the skin near the stomach. This device gives off mild electrical signals. These block extra signals that are being sent to the bladder muscles. This helps the bladder work more normally.  Having surgery  If other options don t work, surgery may be recommended. If surgery is an option, your healthcare provider can discuss it with you and explain its risks and benefits.  Healing after prostate surgery  Surgery on the prostate gland can cause incontinence. Most often, the incontinence is only for a short time. It clears up when healing is complete. Rarely, prostate surgery can result in permanent incontinence.   Date Last Reviewed: 1/1/2017 2000-2018 [employer]. [address] [place] [address], [place] [address]. All rights reserved. This information is not intended as a substitute for professional medical care. Always follow your healthcare professional's instructions.

## 2019-01-03 NOTE — LETTER
January 7, 2019      Sergio CHAVEZ Abeba  [address] [place] [id number]        Dear ,    We are writing to inform you of your test results.           Normal Hemoglobin A1c. No concern for pre-diabetes or diabetes.         Resulted Orders   Hemoglobin A1c   Result Value Ref Range    Hemoglobin A1C 5.1 0 - 5.6 %      Comment:      Normal <5.7% Prediabetes 5.7-6.4%  Diabetes 6.5% or higher - adopted from ADA   consensus guidelines.         If you have any questions or concerns, please call the clinic at the number listed above.       Sincerely,        Mari Galvan, PEDRO PABLO/CB

## 2019-01-04 ASSESSMENT — ANXIETY QUESTIONNAIRES: GAD7 TOTAL SCORE: 1

## 2019-01-04 NOTE — RESULT ENCOUNTER NOTE
Normal Hemoglobin A1c. No concern for pre-diabetes or diabetes.   Please call him with these results and send a hard copy if not on [facility].   Thanks. AUGUSTINE Haro

## 2019-11-27 ENCOUNTER — OFFICE VISIT (OUTPATIENT)
Dept: FAMILY MEDICINE | Facility: CLINIC | Age: 39
End: 2019-11-27
Payer: COMMERCIAL

## 2019-11-27 VITALS
BODY MASS INDEX: 35.92 KG/M2 | SYSTOLIC BLOOD PRESSURE: 120 MMHG | HEART RATE: 80 BPM | HEIGHT: 68 IN | RESPIRATION RATE: 16 BRPM | WEIGHT: 237 LBS | TEMPERATURE: 98.2 F | OXYGEN SATURATION: 98 % | DIASTOLIC BLOOD PRESSURE: 72 MMHG

## 2019-11-27 DIAGNOSIS — E66.09 CLASS 2 OBESITY DUE TO EXCESS CALORIES WITHOUT SERIOUS COMORBIDITY WITH BODY MASS INDEX (BMI) OF 36.0 TO 36.9 IN ADULT: ICD-10-CM

## 2019-11-27 DIAGNOSIS — K59.01 SLOW TRANSIT CONSTIPATION: ICD-10-CM

## 2019-11-27 DIAGNOSIS — K92.1 HEMATOCHEZIA: ICD-10-CM

## 2019-11-27 DIAGNOSIS — E66.812 CLASS 2 OBESITY DUE TO EXCESS CALORIES WITHOUT SERIOUS COMORBIDITY WITH BODY MASS INDEX (BMI) OF 36.0 TO 36.9 IN ADULT: ICD-10-CM

## 2019-11-27 DIAGNOSIS — Z71.89 ACP (ADVANCE CARE PLANNING): Chronic | ICD-10-CM

## 2019-11-27 DIAGNOSIS — G44.219 EPISODIC TENSION-TYPE HEADACHE, NOT INTRACTABLE: ICD-10-CM

## 2019-11-27 DIAGNOSIS — Z00.01 ENCOUNTER FOR ROUTINE ADULT PHYSICAL EXAM WITH ABNORMAL FINDINGS: Primary | ICD-10-CM

## 2019-11-27 LAB
ALBUMIN SERPL-MCNC: 4.3 G/DL (ref 3.4–5)
ALP SERPL-CCNC: 71 U/L (ref 40–150)
ALT SERPL W P-5'-P-CCNC: 30 U/L (ref 0–70)
ANION GAP SERPL CALCULATED.3IONS-SCNC: 4 MMOL/L (ref 3–14)
AST SERPL W P-5'-P-CCNC: 16 U/L (ref 0–45)
BILIRUB SERPL-MCNC: 0.6 MG/DL (ref 0.2–1.3)
BUN SERPL-MCNC: 9 MG/DL (ref 7–30)
CALCIUM SERPL-MCNC: 9.3 MG/DL (ref 8.5–10.1)
CHLORIDE SERPL-SCNC: 108 MMOL/L (ref 94–109)
CO2 SERPL-SCNC: 26 MMOL/L (ref 20–32)
CREAT SERPL-MCNC: 0.72 MG/DL (ref 0.66–1.25)
ERYTHROCYTE [DISTWIDTH] IN BLOOD BY AUTOMATED COUNT: 12.5 % (ref 10–15)
GFR SERPL CREATININE-BSD FRML MDRD: >90 ML/MIN/{1.73_M2}
GLUCOSE SERPL-MCNC: 98 MG/DL (ref 70–99)
HCT VFR BLD AUTO: 43.6 % (ref 40–53)
HGB BLD-MCNC: 15.4 G/DL (ref 13.3–17.7)
MCH RBC QN AUTO: 31 PG (ref 26.5–33)
MCHC RBC AUTO-ENTMCNC: 35.3 G/DL (ref 31.5–36.5)
MCV RBC AUTO: 88 FL (ref 78–100)
PLATELET # BLD AUTO: 234 10E9/L (ref 150–450)
POTASSIUM SERPL-SCNC: 4.2 MMOL/L (ref 3.4–5.3)
PROT SERPL-MCNC: 7.8 G/DL (ref 6.8–8.8)
RBC # BLD AUTO: 4.96 10E12/L (ref 4.4–5.9)
SODIUM SERPL-SCNC: 138 MMOL/L (ref 133–144)
WBC # BLD AUTO: 7.3 10E9/L (ref 4–11)

## 2019-11-27 PROCEDURE — 85027 COMPLETE CBC AUTOMATED: CPT | Performed by: NURSE PRACTITIONER

## 2019-11-27 PROCEDURE — 99395 PREV VISIT EST AGE 18-39: CPT | Performed by: NURSE PRACTITIONER

## 2019-11-27 PROCEDURE — 99214 OFFICE O/P EST MOD 30 MIN: CPT | Mod: 25 | Performed by: NURSE PRACTITIONER

## 2019-11-27 PROCEDURE — 80053 COMPREHEN METABOLIC PANEL: CPT | Performed by: NURSE PRACTITIONER

## 2019-11-27 PROCEDURE — 36415 COLL VENOUS BLD VENIPUNCTURE: CPT | Performed by: NURSE PRACTITIONER

## 2019-11-27 RX ORDER — DOCUSATE SODIUM 100 MG/1
100 CAPSULE, LIQUID FILLED ORAL DAILY
COMMUNITY
Start: 2019-11-27 | End: 2020-08-12

## 2019-11-27 ASSESSMENT — ENCOUNTER SYMPTOMS
HEMATOCHEZIA: 1
WEAKNESS: 0
JOINT SWELLING: 0
HEMATURIA: 0
DIZZINESS: 0
MYALGIAS: 0
ABDOMINAL PAIN: 0
HEARTBURN: 0
HEADACHES: 1
EYE PAIN: 0
PARESTHESIAS: 0
CHILLS: 0
SHORTNESS OF BREATH: 0
ARTHRALGIAS: 0
NERVOUS/ANXIOUS: 0
DIARRHEA: 0
PALPITATIONS: 0
FEVER: 0
CONSTIPATION: 1
FREQUENCY: 0
SORE THROAT: 0
COUGH: 0
DYSURIA: 0
NAUSEA: 0

## 2019-11-27 ASSESSMENT — MIFFLIN-ST. JEOR: SCORE: 1966.58

## 2019-11-27 NOTE — LETTER
November 29, 2019      Sergio Us  [address] [place] [id number]        Dear ,    We are writing to inform you of your test results.    CBC- normal  CMP- normal    Resulted Orders   CBC with platelets   Result Value Ref Range    WBC 7.3 4.0 - 11.0 10e9/L    RBC Count 4.96 4.4 - 5.9 10e12/L    Hemoglobin 15.4 13.3 - 17.7 g/dL    Hematocrit 43.6 40.0 - 53.0 %    MCV 88 78 - 100 fl    MCH 31.0 26.5 - 33.0 pg    MCHC 35.3 31.5 - 36.5 g/dL    RDW 12.5 10.0 - 15.0 %    Platelet Count 234 150 - 450 10e9/L   Comprehensive metabolic panel   Result Value Ref Range    Sodium 138 133 - 144 mmol/L    Potassium 4.2 3.4 - 5.3 mmol/L    Chloride 108 94 - 109 mmol/L    Carbon Dioxide 26 20 - 32 mmol/L    Anion Gap 4 3 - 14 mmol/L    Glucose 98 70 - 99 mg/dL      Comment:      Non Fasting    Urea Nitrogen 9 7 - 30 mg/dL    Creatinine 0.72 0.66 - 1.25 mg/dL    GFR Estimate >90 >60 mL/min/[1.73_m2]      Comment:      Non  GFR Calc  Starting 12/18/2018, serum creatinine based estimated GFR (eGFR) will be   calculated using the Chronic Kidney Disease Epidemiology Collaboration   (CKD-EPI) equation.      GFR Estimate If Black >90 >60 mL/min/[1.73_m2]      Comment:       GFR Calc  Starting 12/18/2018, serum creatinine based estimated GFR (eGFR) will be   calculated using the Chronic Kidney Disease Epidemiology Collaboration   (CKD-EPI) equation.      Calcium 9.3 8.5 - 10.1 mg/dL    Bilirubin Total 0.6 0.2 - 1.3 mg/dL    Albumin 4.3 3.4 - 5.0 g/dL    Protein Total 7.8 6.8 - 8.8 g/dL    Alkaline Phosphatase 71 40 - 150 U/L    ALT 30 0 - 70 U/L    AST 16 0 - 45 U/L       If you have any questions or concerns, please call the clinic at the number listed above.       Sincerely,        Mari Galvan, PEDRO PABLO/rajani

## 2019-11-27 NOTE — PATIENT INSTRUCTIONS
1. Encounter for routine adult physical exam with abnormal findings  Screening    2. Class 2 obesity due to excess calories without serious comorbidity with body mass index (BMI) of 36.0 to 36.9 in adult  Chronic, worsened  Increase physical activity daily- goal 10 lbs weight loss  See information below for tips  Ayla alvarenga- check the calories    3. Slow transit constipation  Chronic, unchanged  - docusate sodium (COLACE) 100 MG capsule; Take 1 capsule (100 mg) by mouth daily  - CBC with platelets  - GASTROENTEROLOGY ADULT REF PROCEDURE ONLY [facility] [phone]; [place] [facility]  - Comprehensive metabolic panel    4. Episodic tension-type headache, not intractable  Chronic, stable  Consider massage    5. ACP (advance care planning)  Drop off honoring choices form at     6. Hematochezia  Chronic, unchanged  - CBC with platelets  - GASTROENTEROLOGY ADULT REF PROCEDURE ONLY [facility] [phone]; [place] [facility]        HOW TO BE HEALTHIER    Tools to use: Innovashop.tv www.Site Intelligence- FREE website that helps you with food choices, and exercise. You can talk with people, talk to trainers and dieticians.    Increase your water intake daily to 6 glasses     Purchase a pedometer that will monitor how many steps you take (10, 000 daily is a goal for everyone)- these are sometimes included in smart phones.    Use meal replacements such as Janene's meals, Lean Cuisines, Healthy Choice, Smart Ones, Weight Watchers Meals, Slim Fast and Glucerna shakes and supplement with fresh fruits and vegetables      Please drink a lot of water daily. Most people typically need about 2 liters of water daily and more if they are exercising, have a large weight, or have a fever or illness. Add Crystal Light for flavoring if desired. But no pop with calories in it.      Keep a food journal of what you eat, calories in what you eat, and mood. You will see where you are getting the majority of  your calories. Bring the journal with you to your nutrition appointment (if you are being referred).      Fitness apps: MyFitnessPal, LifeFitness, Fitbit, Sworkit Fitness(simple to-go fitness), High Intensity HIIT workouts,  NEOU (life and on-demand videos,       Food apps: Weight Watchers, SparkRecipes, LoseIt, Tap&Track, Fooducate, and HealthyOut (restaurant food tracking).      Focus on wet volumetrics, meaning, eat more foods that are high in water and fiber such as fruits and vegetables in order to get that full feeling. These are also good for your overall health as well.      Food delivery Let's [facility] to help you prepare meals for you and your family. Often times, the caloric and nutrition data and serving sizes are available for this food. This can be a time saving maneuver. The website can give you more information http://www.Emerald Logic/. [employer] has and delivers Let's Dish & fresh low calorie salads      Food delivery Home  www.homechef.com      Food delivery Green  www.greenchef.com      Food delivery Hello Fresh at https://www.Naviscan/food-boxes/classic-box/      Try Cooking Light recipes for low calorie meal preparation and planning      Diabetic carb counting: CalorieKing   http://www.calorieking.SchoolMint/      Other food plan options you can search for on the internet and check out include: Willy DUARTE, [facility]    Here are 10 ways to get you moving more often:   1. Be less efficient. People typically try to think of ways to make daily tasks easier. But if we make them harder, we can get more exercise, says Staci Neves, MS, of Chris Gonzalez, a , , and spokeswoman for the [employer] [place] on Exercise (ACE).  Bring in the groceries from your car one bag at a time so you have to make several trips,  Edinson says.  Put the laundry away a few items at a time, rather than carrying it up in a basket.    2. Shun labor-saving devices. Wash  the car by hand rather than taking it to the car wash.  It takes about an hour and a half to do a good job, and in the meantime you ve gotten great exercise,  Edinson says. Use a push mower rather than a riding mower to groom your lawn.   3. Going somewhere? Take the long way. Walking up or down a few flights of stairs each day can be good for your heart. Avoid elevators and escalators whenever possible. If you ride the bus or subway to work, get off a stop before your office and walk the extra distance. When you go to the mall or the grocery store, park furthest from the entrance, not as close to it as you can, and you'll get a few extra minutes of walking -- one of the best exercises there is, Dr. Youngblood says.  Walking is great because anyone can do it and you don t need any special equipment other than a properly fitting pair of sneakers.    4. Be a morning person. Studies show that people who exercise in the morning are more likely to stick with it. As Edinson explains,  Are you going to feel like exercising at the end of a hard day? Probably not. If you do your workout in the morning, you re not only more likely to do it, but you'll also set a positive tone for the day.    5. Ink the deal. Whether morning, afternoon, or evening, pick the time that is most convenient for you to exercise and write it down in your daily planner. Keep your exercise routine as you would keep any appointment.   6. Watch your step. Investing in a good pedometer can help you stay motivated.  If you have a pedometer attached to your waist and you can see how many steps you ve taken, you ll see it doesn t take long to walk 5,000 steps and you will be inspired,  Edinson says. And building up to 10,000 steps a day won t seem like such a daunting a task.   7. Hire the right help. While weight training is important, if you don t know what you re doing, you run the risk of injuring yourself or not being effective, Edinson says. It s best to  get instructions from a  at the gym. You also can buy a weight-training DVD and follow along in your living room.   8. Keep records. Grab a diary or logbook, and every day that you exercise, write down what you did and for how long. Your records will make it easy for you to see what you ve accomplished and make you more accountable. Blank pages? You d be ashamed.   9. Phone a friend. Find someone who likes the same activity that you do -- walking in the neighborhood, riding bikes, playing tennis -- and make a date to do it together.  Exercising with a friend or in a group can be very motivating,  Ford says.  You are likely to walk longer or bike greater distances if you re talking to a friend along the way. The time will go by faster.  Don t have a morgan who is available? Grab an MP3 player and listen to your favorite music or an audio book while exercising.   10. Do what you like. Whatever exercise you choose, be sure it s one that you enjoy. You re more likely to stick with it if it s something you have fun doing rather than something you see as a chore, Ford says.   If you can t fit 40 minutes a day into your schedule, get more exercise simply by being less efficient with your chores and adding a little extra walking distance everywhere you go. However, if you pick an activity you like, finding time for fitness will become effortless and the rewards enormous.   BOOKS ON WEIGHT LOSS  A course in Weight Loss by Earnest Barrow    Eat, Drink, and Weigh Less by Crum researcher Jamal Gaona, and Carmen Odom    Ultrametabolism by Pollo Zamora. Recommend keeping Carbohydrates to  30-45g/meal and 15g/snack with fiber of at least 4g/serving, protein goal of 60-90g/day. Keep food diary on myfitnesspal.com. Recommend pedometer with ultimate goal of 10,000 steps a day.     The Willpower Instinct by Silvia South.    Finding Your Ideal Weight  Most of the people in magazines and on TV are far  slimmer than average, yet this is the  ideal  that many people aim for. Before you decide that you won t be happy until you get down to a certain number of pounds, consider:    Your age. You probably wish you could get back to your college weight. But normal changes in metabolism and hormone levels that occur with aging make it unrealistic.    Your gender. In general, men have more muscle and heavier bones than women, which means that healthy men usually weigh more than healthy women of the same height.    Your current weight. If you are very heavy, focus on losing a smaller amount (such as 10 percent of your body weight). Losing just 5 to 10 pounds can improve your health.  Your Body Fat Percentage  A pound of muscle weighs the same as a pound of fat, but it takes up less space. Think of a trained athlete and a  couch potato.  Even though they may be the same height and weight, the athlete looks fitter, is healthier, and probably wears a smaller size of clothing. If you are muscular, a body fat test may be a more accurate measure of your ideal weight than the bathroom scale. Talk to your healthcare provider, who can help you set appropriate goals for yourself.    Weight Management: Healthy Eating  Food is your body s fuel. You can t live without it. The key is to give your body enough nutrients and energy without eating too much. Reading food labels can help you make healthy choices. Also, learn new eating habits to manage your weight.      All the values on the label are based on one serving. The serving size is the average portion. Remember to multiply the values on the label by the number of servings you eat.   Eat Less Fat  A gram of fat has almost twice the calories of a gram of protein or carbohydrates. Try to balance your food choices so that 20% to 35% of your calories comes from total fat. This means an average of 2  to 3  grams of fat for each 100 calories you eat.  Eat More Fiber  High-fiber foods are  digested more slowly than low-fiber foods, so you feel full longer. Try to get 31 grams of fiber each day. Foods high in fiber include:    Vegetables and fruits    Whole-grain or bran breads, pastas, and cereals    Legumes (beans) and peas  As you begin to eat more fiber, be sure to drink plenty of water to keep your digestive system working smoothly.  Tips    Don t skip meals. This often leads to overeating later on. It s best to spread your eating throughout the day.    Eat a variety of foods, not just a few favorites.    If you find yourself eating when you re not hungry, ask yourself why. Many of us eat when we re bored, stressed, or just to be polite. Listen to your body. If you re not hungry, get busy doing something else instead of eating.    Eat slower. It takes 20 minutes for your stomach to tell your brain that it s full.    Pay attention to what you eat. Don t read or watch TV during your meal.      Weight Management: Exercise and Activity  Goal: at least 40 minutes daily  Studies show that people who exercise are the most likely to lose weight and keep it off. Exercise burns calories. It helps build muscle to make your body stronger. Make exercise part of your weight-management plan. You must hit moderate- to high cardiovascular workout for weight loss. Get your heart rate up to 75% to help you burn fat. Check online for free calculators.  Make Activity Part of Your Day  You may not think you have the time to exercise. But you can work activity into your daily life. Take 10 minutes out of your lunch hour to take a walk. Walk to the [employer] to get your paper instead of having it delivered. Make it a habit to take the stairs instead of the elevator. Park in the farthest parking spot instead of the closest. You ll be surprised at how fast these little changes can make a difference.  The Benefits of Exercise    Exercise increases your metabolism (the speed at which your body burns calories).    Regular  exercise can increase the amount of muscle in your body. Muscle burns calories faster than fat. The more muscle you have, the more calories you burn.    Exercise gives you energy and curbs your appetite.    Exercise decreases stress and helps you sleep better.  Make Exercise Fun  Exercise can be fun. Choose an activity you enjoy. You may even get a friend to do it with you.    Take a resistance-training or aerobics class    Join a team sport    Take a dance class    Walk the dog    Ride a bike  If you have health problems, be sure to ask your doctor before you start an exercise program. Have a  help you develop a plan that s safe for you.     3994-4216 [facility], [address] [place] [address], [place] [address]. All rights reserved. This information is not intended as a substitute for professional medical care. Always follow your healthcare professional's instructions.    OTHER AVENUES TO INCREASE PHYSICAL FITNESS    Mapping Walks and Runs, biking   www.mapmyrun.com   www.mapmybike.com    Rating walkability of a neighborhood   www.Fippex    Treadmill Work Stations   www.YooDeal    Free Workouts at home   www.BrandMe crowdmarketing    Walking, hiking, biking trails   www.ralistotrails.com    Active volunteer opportunities   www.[email or web address] Street.Axtria    Race or walk/run events in your area   www.active.OneSpin Solutions    WorkoutQuantConnect    wwwRx Networks/Travel and Learning Enterprises/   Invup.SixthEye    Adventure Vacations   www.evidanza    Heart rate Monitors   www.polarusa.com    Pedometers   www.ezeep.com    Outdoor treasure hunts on your own   www.Bangcle    7 Worst Junk Ingredients to Avoid   (If you don't know what it is, it probably isn't good to eat it!)  1. Sodium Nitrate (also called Sodium Nitrite)  This is a preservative, coloring, and flavoring commonly found in processed meats like bryant, ham, hot dogs, cold cuts and smoked fish. Studies have shown that it reacts with the body s digestive  acids to form a cancer-causing agent called nitrosamines. So double-check that  healthy  turkey for carcinogens before you gobble down your sandwich!  2. Aspartame (aka NutraSweet/Equal)  In scientific terms, this is a chemical combination of two amino acids and methanol. It s better known by the brand names NutraSweet and Equal, which are sweeteners found in countless  diet  desserts, drink mixes, and soft drinks. Aspartame was once thought to be a safe artificial sweetener, but it is now believed to cause cancer and neurological problems such as dizziness and hallucinations.  3. Acesulfame-K  This artificial sweetener is 200 times sweeter than sugar and is often found in chewing gum and soft drinks. When tested in the laboratory, it caused cancer in rats. And that makes this additive a lot less sweet in our opinion!  4. Artificial Food Colorings  There are food colorings being used that are linked with cancer in animal testing as well as behavioral disorders in children. These include Yellow 5, Red 40, Blue 1, Blue 2, Green 3, Orange B, Red 3 and Yellow 6. Amazingly, these colors have been banned in the [place] yet remain in many American foods. They can easily be avoided by choosing natural foods that aren t chemically or colorfully enhanced.  5. MSG  Monosodium Glutamate (MSG) is an amino acid used as a flavor enhancer in many soups, salad dressings, chips, frozen entrees and restaurant food. This nasty additive can alejandro with the nervous system causing side effects like migraines and overeating in some individuals. MSG appears on labels under several aliases, including yeast extract and calcium caseinate. It s even been found on the labels of organic products! Here s a list of the common aliases for MSG.    Monosodium Glutamate    Hydrolyzed Vegetable Protein    Hydrolyzed Protein    Hydrolyzed Plant Protein    Plant Protein Extract    Sodium Caseinate    Calcium Caseinate    Yeast Extract    Textured  Protein    Autolyzed Yeast    Hydrolyzed Oat Flour    6. Trans Fats  Trans fats cause heart disease. It s a proven fact. Before purchasing any packaged food, check the ingredients list. Even if the label boasts  0g trans fats  BEWARE the product may still contain up to a 0.5g of trans fats per serving, if you see the words partially hydrogenated oils on the ingredients list. It s important to avoid even the smallest amount because it can raise your bad cholesterol and lower your good cholesterol, making you susceptible to all kinds of health problems!  7. Sodium Benzoate  Sodium benzoate is a preservative used in many foods and beverages. This ingredient is known to cause hives, asthma and other allergic reactions in sensitive individuals. New research shows that it may also cause behavioral disorders in children. One more reason to avoid this harmful ingredient: When used in beverages that also contain ascorbic acid (vitamin C) it forms benzene, a known carcinogen. Some drink manufacturers are still using this toxic duo, so you may have benzene lurking in your favorite drink!          For constipation:   1. Consume at least 8 glasses of water per day   2. Exercise for at least 30 minutes every day. Regular exercise helps to keep your digestive system active and and healthy and may help with constipation.   3. Increase dietary fiber. Goal of 25 grams per day for women, 35 grams per day for men. If unable to consume 25-35 grams through diet alone consider OTC supplements such as Benefiber, FiberCon, Metamucil, or Citrucel.   4. Recommend taking Miralax (17 grams = 1 scoop). Take once daily, can mix with anything. If you experience increasing bowel movements and diarrhea, decrease to every other day or every 3rd day. Miralax is an osmotic laxative that increases the amount of water secreted by the intestines resulting in softer and easier to pass stools.   5. Also recommend stimulant laxative such as Senna, Ex Lax or  Dulcolax. Stimulant laxatives speed up the colonic motility of your gut helping to induce a bowel movement. Take as needed at bedtime.   6. If you are still having difficulties with constipation may also add a stool softener to your bowel regimen such a Docusate.           Patient Education     Excess Gas  Certain foods produce gas when digested. In some people, these foods make an excessive amount of gas. This may cause bloating, burping, or increased gas passing through the rectum (flatulence).     Foods that cause gas  The following foods are more likely to cause this problem. Limit them, or remove them from your diet:    Broccoli    Cauliflower    [place] sprouts    Cabbage    Cooked dried beans    Fizzy (carbonated) drinks, such as sparkling water, soda, beer, and champagne  Other causes  Other causes of excess gas include:    Eating too fast or talking while you chew. This may cause you to swallow air. This increases the amount of gas in your stomach. And it may make your symptoms worse. Chew each mouthful completely before you swallow. Take your time.    Chewing on gum or sucking on hard candy. These cause you to swallow more often. And some of what you are swallowing is air. This leads to more gas in your stomach. Avoid chewing gum and hard candy.    Overeating. This may increase the feeling of being bloated and cause more gas. When you are full, stop eating.     Being constipated. This can increase the amount of normal intestinal gas. Avoid constipation by getting more fiber in your diet. Good sources of fiber include whole-grain cereal, fresh vegetables (except those in the above list), and fresh fruits. High-fiber foods absorb water and carry it out of the body. When adding more fiber to your diet, you also need to drink more water. You should drink at least 8, 8-ounce glasses of water (2 quarts) per day.  Date Last Reviewed: 8/1/2016 2000-2018 [employer]. [address] [facility],  [place] [address]. All rights reserved. This information is not intended as a substitute for professional medical care. Always follow your healthcare professional's instructions.           Patient Education     Understanding Rectal Bleeding    Rectal bleeding is when blood passes through your rectum and anus. It can happen with or without a bowel movement. Rectal bleeding may be a sign of a serious problem in your rectum, colon, or upper GI tract. Call your healthcare provider right away if you have any rectal bleeding.  The GI Tract  The gastrointestinal (GI) tract includes the mouth, esophagus, stomach, small intestine, large intestine (colon), rectum, and anus. The food you eat is digested as it passes through the GI tract. Solid waste leaves the body through the rectum.   Rectal bleeding and GI problems  The cause of rectal bleeding may be found in any region of the GI tract. The colon or rectum may be the site of your bleeding problem. Or, bleeding may be due to problems farther up the GI tract, such as in the small intestine, duodenum, or stomach.  Causes of rectal bleeding  Rectal bleeding causes include the following:    Hemorrhoids (swollen veins in the rectum and anus)    Fissures (tears in or near the anus)    Diverticulosis (inflamed pockets in the colon wall)    Infection    Ischemia (low blood flow)    Radiation damage    Inflammatory bowel disease (Crohn's disease or ulcerative colitis)    Ulcers in the upper GI tract and inflammation of the large intestine    Abnormal tissue growths (tumors or polyps) in the GI tract    A bulging rectum (also called a rectal prolapse)    Abnormal blood vessels in the small intestine or in the colon  Common symptoms  Common symptoms include the following:    Rectal pain, itching, or soreness    Belly pain or epigastric pain    Minor occasional drops of blood that appear on the stool or toilet paper, to greater amounts of stool that appear black or tarry   Rectal  bleeding can also happen without pain.  Date Last Reviewed: 7/1/2016 2000-2018 [employer]. [address] [place] [address], [place] [address]. All rights reserved. This information is not intended as a substitute for professional medical care. Always follow your healthcare professional's instructions.           Patient Education   Coping with Constipation  What is constipation?  If you have hard, dry stools that are difficult to pass, you have constipation. You may also have bloating, gas and stomach cramps.  How is it treated?  If the problem is severe, your care team can suggest medicine to relieve it (a laxative, stool softener or enema). Do not use medicine unless your care team tells you to.  You should not use an enema (rectal wash) if your white blood cell or platelet count is low.  What else can I do to treat or prevent constipation?    Eat at the same times each day.    Add fiber to your diet by eating:  ? Whole grain breads, cereals and pastas  ? Whole grains such as barley or brown rice  ? Raw vegetables  ? Fresh and dried fruits  ? Dried beans and peas  ? Nuts, seeds and popcorn.    Drink lots of fluids: at least eight to ten 8-ounce glasses each day. Try water, prune juice, warm juices, herbal teas and lemonade.    Have a hot drink with high-fiber foods for breakfast.    Eat the skins on fruits and potatoes. Wash them well before eating.    Add wheat bran to cereals, casseroles and homemade breads.    If gas is a problem:  ? Avoid gas-forming foods such carbonated (fizzy) drinks, broccoli, cabbage, cauliflower, dried beans and peas, peppers and onions.  ? Do not use a straw.  ? Do not chew gum.    Stay active. Simply getting out for a walk can help. Increase your exercise as you are able.    Try to use the toilet at the same times each day. Keep a record of your bowel movements.    If you take medicine that may cause constipation, your doctor may ask you to take a stool softener.  When should I  call my care team?  Call your care team if:    You do not have a bowel movement for two or more days.    You have sudden, severe belly pain.    You notice blood in your stool.    You have severe hemorrhoids (swelling, itching and pain around the anus).  Comments:  __________________________________________  __________________________________________  __________________________________________  __________________________________________  __________________________________________  __________________________________________  __________________________________________  __________________________________________  __________________________________________  __________________________________________  For informational purposes only. Not to replace the advice of your health care provider. Copyright   2006 [facility]. All rights reserved. Clinically reviewed by [facility]. [employer] [id number] - REV 04/19.         HOW TO BE HEALTHIER    Tools to use: Biotronics3D www.AltaVitas- FREE website that helps you with food choices, and exercise. You can talk with people, talk to trainers and dieticians.    Increase your water intake daily to 6 glasses     Purchase a pedometer that will monitor how many steps you take (10, 000 daily is a goal for everyone)- these are sometimes included in smart phones.    Use meal replacements such as Janene's meals, Lean Cuisines, Healthy Choice, Smart Ones, Weight Watchers Meals, Slim Fast and Glucerna shakes and supplement with fresh fruits and vegetables      Please drink a lot of water daily. Most people typically need about 2 liters of water daily and more if they are exercising, have a large weight, or have a fever or illness. Add Crystal Light for flavoring if desired. But no pop with calories in it.      Keep a food journal of what you eat, calories in what you eat, and mood. You will see where you are getting the majority of your calories. Bring the journal with you  to your nutrition appointment (if you are being referred).      Fitness apps: MyFitnessPal, LifeFitness, Fitbit, Sworkit Fitness(simple to-go fitness), High Intensity HIIT workouts,  NEOU (life and on-demand videos,       Food apps: Weight Watchers, SparkRecipes, LoseIt, Tap&Track, Fooducate, and HealthyOut (restaurant food tracking).      Focus on wet volumetrics, meaning, eat more foods that are high in water and fiber such as fruits and vegetables in order to get that full feeling. These are also good for your overall health as well.      Food delivery Let's [facility] to help you prepare meals for you and your family. Often times, the caloric and nutrition data and serving sizes are available for this food. This can be a time saving maneuver. The website can give you more information http://www.mNectar/. [employer] has and delivers Let's Dish & fresh low calorie salads      Food delivery Home  www.homechef.com      Food delivery Green  www.greenchef.com      Food delivery Hello Fresh at https://www.Global Registry of BiorepositoriesloMedaNext/food-boxes/classic-box/      Try Cooking Light recipes for low calorie meal preparation and planning      Diabetic carb counting: CalorieKing   http://www.calorieking.Tr[employer]./      Other food plan options you can search for on the internet and check out include: Willy DUARTE, [place]    Here are 10 ways to get you moving more often:   11. Be less efficient. People typically try to think of ways to make daily tasks easier. But if we make them harder, we can get more exercise, says Staci Neves MS, of Chris Gonzalez, a , , and spokeswoman for the [place] on Exercise (ACE).  Bring in the groceries from your car one bag at a time so you have to make several trips,  Edinson says.  Put the laundry away a few items at a time, rather than carrying it up in a basket.    12. Shun labor-saving devices. Wash the car by hand rather than taking it to  the car wash.  It takes about an hour and a half to do a good job, and in the meantime you ve gotten great exercise,  Edinson says. Use a push mower rather than a riding mower to groom your lawn.   13. Going somewhere? Take the long way. Walking up or down a few flights of stairs each day can be good for your heart. Avoid elevators and escalators whenever possible. If you ride the bus or subway to work, get off a stop before your office and walk the extra distance. When you go to the mall or the grocery store, park furthest from the entrance, not as close to it as you can, and you'll get a few extra minutes of walking -- one of the best exercises there is, Dr. Youngblood says.  Walking is great because anyone can do it and you don t need any special equipment other than a properly fitting pair of sneakers.    14. Be a morning person. Studies show that people who exercise in the morning are more likely to stick with it. As Edinson explains,  Are you going to feel like exercising at the end of a hard day? Probably not. If you do your workout in the morning, you re not only more likely to do it, but you'll also set a positive tone for the day.    15. Ink the deal. Whether morning, afternoon, or evening, pick the time that is most convenient for you to exercise and write it down in your daily planner. Keep your exercise routine as you would keep any appointment.   16. Watch your step. Investing in a good pedometer can help you stay motivated.  If you have a pedometer attached to your waist and you can see how many steps you ve taken, you ll see it doesn t take long to walk 5,000 steps and you will be inspired,  Edinson says. And building up to 10,000 steps a day won t seem like such a daunting a task.   17. Hire the right help. While weight training is important, if you don t know what you re doing, you run the risk of injuring yourself or not being effective, Edinson says. It s best to get instructions from a personal   at the gym. You also can buy a weight-training DVD and follow along in your living room.   18. Keep records. Grab a diary or logbook, and every day that you exercise, write down what you did and for how long. Your records will make it easy for you to see what you ve accomplished and make you more accountable. Blank pages? You d be ashamed.   19. Phone a friend. Find someone who likes the same activity that you do -- walking in the neighborhood, riding bikes, playing tennis -- and make a date to do it together.  Exercising with a friend or in a group can be very motivating,  Ford says.  You are likely to walk longer or bike greater distances if you re talking to a friend along the way. The time will go by faster.  Don t have a morgan who is available? Grab an MP3 player and listen to your favorite music or an audio book while exercising.   20. Do what you like. Whatever exercise you choose, be sure it s one that you enjoy. You re more likely to stick with it if it s something you have fun doing rather than something you see as a chore, Ford says.   If you can t fit 40 minutes a day into your schedule, get more exercise simply by being less efficient with your chores and adding a little extra walking distance everywhere you go. However, if you pick an activity you like, finding time for fitness will become effortless and the rewards enormous.   BOOKS ON WEIGHT LOSS  A course in Weight Loss by Earnest Barrow    Eat, Drink, and Weigh Less by [place] researcher Jamal Gaona, and Carmen Odom    Ultrametabolism by Pollo Zamora. Recommend keeping Carbohydrates to  30-45g/meal and 15g/snack with fiber of at least 4g/serving, protein goal of 60-90g/day. Keep food diary on myfitnesspal.com. Recommend pedometer with ultimate goal of 10,000 steps a day.     The Willpower Instinct by Silvia South.    Finding Your Ideal Weight  Most of the people in magazines and on TV are far slimmer than average, yet this  is the  ideal  that many people aim for. Before you decide that you won t be happy until you get down to a certain number of pounds, consider:    Your age. You probably wish you could get back to your college weight. But normal changes in metabolism and hormone levels that occur with aging make it unrealistic.    Your gender. In general, men have more muscle and heavier bones than women, which means that healthy men usually weigh more than healthy women of the same height.    Your current weight. If you are very heavy, focus on losing a smaller amount (such as 10 percent of your body weight). Losing just 5 to 10 pounds can improve your health.  Your Body Fat Percentage  A pound of muscle weighs the same as a pound of fat, but it takes up less space. Think of a trained athlete and a  couch potato.  Even though they may be the same height and weight, the athlete looks fitter, is healthier, and probably wears a smaller size of clothing. If you are muscular, a body fat test may be a more accurate measure of your ideal weight than the bathroom scale. Talk to your healthcare provider, who can help you set appropriate goals for yourself.    Weight Management: Healthy Eating  Food is your body s fuel. You can t live without it. The key is to give your body enough nutrients and energy without eating too much. Reading food labels can help you make healthy choices. Also, learn new eating habits to manage your weight.      All the values on the label are based on one serving. The serving size is the average portion. Remember to multiply the values on the label by the number of servings you eat.   Eat Less Fat  A gram of fat has almost twice the calories of a gram of protein or carbohydrates. Try to balance your food choices so that 20% to 35% of your calories comes from total fat. This means an average of 2  to 3  grams of fat for each 100 calories you eat.  Eat More Fiber  High-fiber foods are digested more slowly than  low-fiber foods, so you feel full longer. Try to get 31 grams of fiber each day. Foods high in fiber include:    Vegetables and fruits    Whole-grain or bran breads, pastas, and cereals    Legumes (beans) and peas  As you begin to eat more fiber, be sure to drink plenty of water to keep your digestive system working smoothly.  Tips    Don t skip meals. This often leads to overeating later on. It s best to spread your eating throughout the day.    Eat a variety of foods, not just a few favorites.    If you find yourself eating when you re not hungry, ask yourself why. Many of us eat when we re bored, stressed, or just to be polite. Listen to your body. If you re not hungry, get busy doing something else instead of eating.    Eat slower. It takes 20 minutes for your stomach to tell your brain that it s full.    Pay attention to what you eat. Don t read or watch TV during your meal.      Weight Management: Exercise and Activity  Goal: at least 40 minutes daily  Studies show that people who exercise are the most likely to lose weight and keep it off. Exercise burns calories. It helps build muscle to make your body stronger. Make exercise part of your weight-management plan. You must hit moderate- to high cardiovascular workout for weight loss. Get your heart rate up to 75% to help you burn fat. Check online for free calculators.  Make Activity Part of Your Day  You may not think you have the time to exercise. But you can work activity into your daily life. Take 10 minutes out of your lunch hour to take a walk. Walk to the newsstand to get your paper instead of having it delivered. Make it a habit to take the stairs instead of the elevator. Park in the farthest parking spot instead of the closest. You ll be surprised at how fast these little changes can make a difference.  The Benefits of Exercise    Exercise increases your metabolism (the speed at which your body burns calories).    Regular exercise can increase the  amount of muscle in your body. Muscle burns calories faster than fat. The more muscle you have, the more calories you burn.    Exercise gives you energy and curbs your appetite.    Exercise decreases stress and helps you sleep better.  Make Exercise Fun  Exercise can be fun. Choose an activity you enjoy. You may even get a friend to do it with you.    Take a resistance-training or aerobics class    Join a team sport    Take a dance class    Walk the dog    Ride a bike  If you have health problems, be sure to ask your doctor before you start an exercise program. Have a  help you develop a plan that s safe for you.     3984-1374 [facility], [address] [place] [address], [place] [address]. All rights reserved. This information is not intended as a substitute for professional medical care. Always follow your healthcare professional's instructions.    OTHER AVENUES TO INCREASE PHYSICAL FITNESS    Mapping Walks and Runs, biking   www.mapmyrun.com   www.mapmybike.com    Rating walkability of a neighborhood   www.Sentient    Treadmill Work Stations   www.Picooc Technology    Free Workouts at home   www.RaNA Therapeutics.OKWave    Walking, hiking, biking trails   www.ralistotrails.com    Active volunteer opportunities   www.Theravasc.Oriental-Creations    Race or walk/run events in your area   www.active.[employer]ures    WorkoutZipwhip    www.Tape TV/eziCONEX/   NN LABS    Adventure Vacations   www.Berggi    Heart rate Monitors   www.polarusa.com    Pedometers   www.Matrix Asset Management.CitizenHawk    Outdoor treasure hunts on your own   www.Rezee    7 Worst Junk Ingredients to Avoid   (If you don't know what it is, it probably isn't good to eat it!)  1. Sodium Nitrate (also called Sodium Nitrite)  This is a preservative, coloring, and flavoring commonly found in processed meats like bryant, ham, hot dogs, cold cuts and smoked fish. Studies have shown that it reacts with the body s digestive acids to form a  cancer-causing agent called nitrosamines. So double-check that  healthy  turkey for carcinogens before you gobble down your sandwich!  2. Aspartame (aka NutraSweet/Equal)  In scientific terms, this is a chemical combination of two amino acids and methanol. It s better known by the brand names NutraSweet and Equal, which are sweeteners found in countless  diet  desserts, drink mixes, and soft drinks. Aspartame was once thought to be a safe artificial sweetener, but it is now believed to cause cancer and neurological problems such as dizziness and hallucinations.  3. Acesulfame-K  This artificial sweetener is 200 times sweeter than sugar and is often found in chewing gum and soft drinks. When tested in the laboratory, it caused cancer in rats. And that makes this additive a lot less sweet in our opinion!  4. Artificial Food Colorings  There are food colorings being used that are linked with cancer in animal testing as well as behavioral disorders in children. These include Yellow 5, Red 40, Blue 1, Blue 2, Green 3, Orange B, Red 3 and Yellow 6. Amazingly, these colors have been banned in the [place] yet remain in many [place] foods. They can easily be avoided by choosing natural foods that aren t chemically or colorfully enhanced.  5. MSG  Monosodium Glutamate (MSG) is an amino acid used as a flavor enhancer in many soups, salad dressings, chips, frozen entrees and restaurant food. This nasty additive can alejandro with the nervous system causing side effects like migraines and overeating in some individuals. MSG appears on labels under several aliases, including yeast extract and calcium caseinate. It s even been found on the labels of organic products! Here s a list of the common aliases for MSG.    Monosodium Glutamate    Hydrolyzed Vegetable Protein    Hydrolyzed Protein    Hydrolyzed Plant Protein    Plant Protein Extract    Sodium Caseinate    Calcium Caseinate    Yeast Extract    Textured  Protein    Autolyzed Yeast    Hydrolyzed Oat Flour    6. Trans Fats  Trans fats cause heart disease. It s a proven fact. Before purchasing any packaged food, check the ingredients list. Even if the label boasts  0g trans fats  BEWARE the product may still contain up to a 0.5g of trans fats per serving, if you see the words partially hydrogenated oils on the ingredients list. It s important to avoid even the smallest amount because it can raise your bad cholesterol and lower your good cholesterol, making you susceptible to all kinds of health problems!  7. Sodium Benzoate  Sodium benzoate is a preservative used in many foods and beverages. This ingredient is known to cause hives, asthma and other allergic reactions in sensitive individuals. New research shows that it may also cause behavioral disorders in children. One more reason to avoid this harmful ingredient: When used in beverages that also contain ascorbic acid (vitamin C) it forms benzene, a known carcinogen. Some drink manufacturers are still using this toxic duo, so you may have benzene lurking in your favorite drink!

## 2019-11-27 NOTE — PROGRESS NOTES
SUBJECTIVE:   CC: Sergio Us is an 39 year old male who presents for preventative health visit.     Healthy Habits:     Getting at least 3 servings of Calcium per day:  Yes    Bi-annual eye exam:  Yes    Dental care twice a year:  Yes    Sleep apnea or symptoms of sleep apnea:  None    Diet:  Regular (no restrictions)    Frequency of exercise:  2-3 days/week    Duration of exercise:  15-30 minutes    Taking medications regularly:  Not Applicable    Barriers to taking medications:  Not applicable    Medication side effects:  Not applicable    PHQ-2 Total Score: 0    Additional concerns today:  No    Constipation  Magnesium- gave him diarrhea  Blood in the toilet once or twice weekly  Doesn't drink milk  Cheese rarely  Gassy+++  Pop 1-2 daily   No gum    Headache to occipital region  Only when he lays down at night  Tried many pillows  Glasses- its been 2 years since he last had them checked  Eyes hurt a little  HA X 10 years  Massage occasionally    Obesity  Exercise: once weekly, walk or stationary bike  Hasn't done much since we talked in 11/2018 about WW, increasing exercise  Wife is also having issues with weight  30 lbs in 10 months  Wt Readings from Last 10 Encounters:   11/27/19 107.5 kg (237 lb)   01/03/19 94.3 kg (208 lb)   11/01/18 91.6 kg (202 lb)   01/11/17 82.1 kg (181 lb)   08/05/16 84.8 kg (187 lb)   06/15/16 82.6 kg (182 lb 3.2 oz)   08/08/13 117.5 kg (259 lb)   08/30/12 115.7 kg (255 lb)   05/21/12 115.8 kg (255 lb 3.2 oz)         Component      Latest Ref Rng & Units 6/15/2016 11/1/2018 1/3/2019   Sodium      133 - 144 mmol/L  139    Potassium      3.4 - 5.3 mmol/L  4.1    Chloride      94 - 109 mmol/L  105    Carbon Dioxide      20 - 32 mmol/L  25    Anion Gap      3 - 14 mmol/L  9    Glucose      70 - 99 mg/dL  97    Urea Nitrogen      7 - 30 mg/dL  17    Creatinine      0.66 - 1.25 mg/dL  0.81    GFR Estimate      >60 mL/min/1.7m2  >90    GFR Estimate If Black      >60 mL/min/1.7m2  >90     Calcium      8.5 - 10.1 mg/dL  9.1    Bilirubin Total      0.2 - 1.3 mg/dL  0.6    Albumin      3.4 - 5.0 g/dL  4.1    Protein Total      6.8 - 8.8 g/dL  7.6    Alkaline Phosphatase      40 - 150 U/L  63    ALT      0 - 70 U/L  29    AST      0 - 45 U/L  15    WBC      4.0 - 11.0 10e9/L  6.1    RBC Count      4.4 - 5.9 10e12/L  4.89    Hemoglobin      13.3 - 17.7 g/dL  15.3    Hematocrit      40.0 - 53.0 %  43.5    MCV      78 - 100 fl  89    MCH      26.5 - 33.0 pg  31.3    MCHC      31.5 - 36.5 g/dL  35.2    RDW      10.0 - 15.0 %  12.2    Platelet Count      150 - 450 10e9/L  202    Cholesterol      <200 mg/dL 118     Triglycerides      <150 mg/dL 73     HDL Cholesterol      >39 mg/dL 48     LDL Cholesterol Calculated      <100 mg/dL 55     Non HDL Cholesterol      <130 mg/dL 70     TSH      0.40 - 4.00 mU/L  1.36    Hemoglobin A1C      0 - 5.6 %   5.1     Today's PHQ-2 Score:   PHQ-2 ( 1999 Pfizer) 11/27/2019   Q1: Little interest or pleasure in doing things 0   Q2: Feeling down, depressed or hopeless 0   PHQ-2 Score 0   Q1: Little interest or pleasure in doing things Not at all   Q2: Feeling down, depressed or hopeless Not at all   PHQ-2 Score 0       Abuse: Current or Past(Physical, Sexual or Emotional)- No  Do you feel safe in your environment? Yes        Social History     Tobacco Use     Smoking status: Never Smoker     Smokeless tobacco: Never Used   Substance Use Topics     Alcohol use: Yes     Comment: rare         Alcohol Use 11/27/2019   Prescreen: >3 drinks/day or >7 drinks/week? No   Prescreen: >3 drinks/day or >7 drinks/week? -       Last PSA:   PSA   Date Value Ref Range Status   11/01/2018 0.97 0 - 4 ug/L Final     Comment:     Assay Method:  Chemiluminescence using Siemens Vista analyzer       Reviewed orders with patient. Reviewed health maintenance and updated orders accordingly - Yes  Labs reviewed in EPIC  BP Readings from Last 3 Encounters:   11/27/19 120/72   01/03/19 108/60   11/01/18  120/62    Wt Readings from Last 3 Encounters:   19 107.5 kg (237 lb)   19 94.3 kg (208 lb)   18 91.6 kg (202 lb)                  Patient Active Problem List   Diagnosis     Slow transit constipation     Family history of diabetes mellitus     Family history of thyroid disorder     CARDIOVASCULAR SCREENING; LDL GOAL LESS THAN 130     Class 2 obesity due to excess calories without serious comorbidity with body mass index (BMI) of 36.0 to 36.9 in adult     ACP (advance care planning)     Patient is Denominational     Episodic tension-type headache, not intractable     Hematochezia     History reviewed. No pertinent surgical history.    Social History     Tobacco Use     Smoking status: Never Smoker     Smokeless tobacco: Never Used   Substance Use Topics     Alcohol use: Yes     Comment: rare     Family History   Problem Relation Age of Onset     Myocardial Infarction Father 44             Heart Failure Father      Diabetes Brother         type 1     Diabetes Mother         type 2     Cerebrovascular Disease Mother      Thyroid Disease Mother      Cancer Other         multiple in the extended family, unsure of types     Cancer Maternal Grandmother         passed from this     No Known Problems Maternal Grandfather      Cancer Paternal Grandmother         passed from that     Brain Cancer Maternal Aunt      Heart Failure Paternal Grandfather         54 yo         Current Outpatient Medications   Medication Sig Dispense Refill     Ascorbic Acid (VITAMIN C PO)        B Complex Vitamins (B COMPLEX PO)        Cholecalciferol (VITAMIN D3 PO) Take by mouth daily       docusate sodium (COLACE) 100 MG capsule Take 1 capsule (100 mg) by mouth daily       glucosamine-chondroitin 500-400 MG CAPS Take 1 capsule by mouth daily       Inulin (FIBER CHOICE PO)        No Known Allergies    Reviewed and updated as needed this visit by clinical staff  Tobacco  Allergies  Meds  Problems  Med Hx  Surg Hx   "Fam Hx  Soc Hx          Reviewed and updated as needed this visit by Provider  Tobacco  Allergies  Meds  Problems  Med Hx  Surg Hx  Fam Hx          Review of Systems   Constitutional: Negative for chills and fever.   HENT: Negative for congestion, ear pain, hearing loss and sore throat.    Eyes: Negative for pain and visual disturbance.   Respiratory: Negative for cough and shortness of breath.    Cardiovascular: Negative for chest pain, palpitations and peripheral edema.   Gastrointestinal: Positive for constipation and hematochezia. Negative for abdominal pain, diarrhea, heartburn and nausea.   Genitourinary: Negative for discharge, dysuria, frequency, genital sores, hematuria, impotence and urgency.   Musculoskeletal: Negative for arthralgias, joint swelling and myalgias.   Skin: Negative for rash.   Neurological: Positive for headaches. Negative for dizziness, weakness and paresthesias.   Psychiatric/Behavioral: Negative for mood changes. The patient is not nervous/anxious.        OBJECTIVE:   /72 (BP Location: Right arm, Patient Position: Sitting, Cuff Size: Adult Large)   Pulse 80   Temp 98.2  F (36.8  C) (Tympanic)   Resp 16   Ht 1.715 m (5' 7.5\")   Wt 107.5 kg (237 lb)   SpO2 98%   BMI 36.57 kg/m      Physical Exam  GENERAL: healthy, alert and no distress  EYES: Eyes grossly normal to inspection, PERRL and conjunctivae and sclerae normal  HENT: ear canals and TM's normal, nose and mouth without ulcers or lesions  NECK: no adenopathy, no asymmetry, masses, or scars and thyroid normal to palpation  RESP: lungs clear to auscultation - no rales, rhonchi or wheezes  CV: regular rate and rhythm, normal S1 S2, no S3 or S4, no murmur, click or rub, no peripheral edema and peripheral pulses strong  ABDOMEN: soft, nontender, no hepatosplenomegaly, no masses and bowel sounds normal   (male): deferred  MS: no gross musculoskeletal defects noted, no edema  SKIN: no suspicious lesions or " rashes  NEURO: Normal strength and tone, mentation intact and speech normal  PSYCH: mentation appears normal, affect normal/bright        ASSESSMENT/PLAN:     Patient Instructions     1. Encounter for routine adult physical exam with abnormal findings  Screening    2. Class 2 obesity due to excess calories without serious comorbidity with body mass index (BMI) of 36.0 to 36.9 in adult  Chronic, worsened  Increase physical activity daily- goal 10 lbs weight loss  See information below for tips  [place] colette- check the calories    3. Slow transit constipation  Chronic, unchanged  - docusate sodium (COLACE) 100 MG capsule; Take 1 capsule (100 mg) by mouth daily  - CBC with platelets  - GASTROENTEROLOGY ADULT REF PROCEDURE ONLY [facility] [phone]; [place] General Surgeon  - Comprehensive metabolic panel    4. Episodic tension-type headache, not intractable  Chronic, stable  Consider massage    5. ACP (advance care planning)  Drop off honoring choices form at     6. Hematochezia  Chronic, unchanged  - CBC with platelets  - GASTROENTEROLOGY ADULT REF PROCEDURE ONLY [facility] [phone]; [place] General Surgeon        HOW TO BE HEALTHIER    Tools to use: Image Metrics www.View the Space- FREE website that helps you with food choices, and exercise. You can talk with people, talk to trainers and dieticians.    Increase your water intake daily to 6 glasses     Purchase a pedometer that will monitor how many steps you take (10, 000 daily is a goal for everyone)- these are sometimes included in smart phones.    Use meal replacements such as Janene's meals, Lean Cuisines, Healthy Choice, Smart Ones, Weight Watchers Meals, Slim Fast and Glucerna shakes and supplement with fresh fruits and vegetables      Please drink a lot of water daily. Most people typically need about 2 liters of water daily and more if they are exercising, have a large weight, or have a fever or illness. Add Crystal Light for  flavoring if desired. But no pop with calories in it.      Keep a food journal of what you eat, calories in what you eat, and mood. You will see where you are getting the majority of your calories. Bring the journal with you to your nutrition appointment (if you are being referred).      Fitness apps: MyFitnessPal, LifeFitness, Fitbit, Sworkit Fitness(simple to-go fitness), High Intensity HIIT workouts,  NEOU (life and on-demand videos,       Food apps: Weight Watchers, SparkRecipes, LoseIt, Tap&Track, Fooducate, and HealthyOut (restaurant food tracking).      Focus on wet volumetrics, meaning, eat more foods that are high in water and fiber such as fruits and vegetables in order to get that full feeling. These are also good for your overall health as well.      Food delivery Let's [facility] to help you prepare meals for you and your family. Often times, the caloric and nutrition data and serving sizes are available for this food. This can be a time saving maneuver. The website can give you more information http://www.ZeeWhere/. [employer] has and delivers Let's Dish & fresh low calorie salads      Food delivery Home  www.homeGoumin.comf.com      Food delivery Green  www.greenchef.com      Food delivery Hello Fresh at https://[email or web address].Chameleon BioSurfaces/food-boxes/classic-box/      Try Cooking Light recipes for low calorie meal preparation and planning      Diabetic carb counting: CalorieKing   http://www.calorieking.com/      Other food plan options you can search for on the internet and check out include: Willy DUARTE, [facility]    Here are 10 ways to get you moving more often:   1. Be less efficient. People typically try to think of ways to make daily tasks easier. But if we make them harder, we can get more exercise, says Staci Neves MS, of Chris Gonzalez, a , , and spokeswoman for the American [place] on Exercise (ACE).  Bring in the groceries from your car one bag  at a time so you have to make several trips,  Edinson says.  Put the laundry away a few items at a time, rather than carrying it up in a basket.    2. Shun labor-saving devices. Wash the car by hand rather than taking it to the car wash.  It takes about an hour and a half to do a good job, and in the meantime you ve gotten great exercise,  Edinson says. Use a push mower rather than a riding mower to groom your lawn.   3. Going somewhere? Take the long way. Walking up or down a few flights of stairs each day can be good for your heart. Avoid elevators and escalators whenever possible. If you ride the bus or subway to work, get off a stop before your office and walk the extra distance. When you go to the mall or the grocery store, park furthest from the entrance, not as close to it as you can, and you'll get a few extra minutes of walking -- one of the best exercises there is, Dr. Youngblood says.  Walking is great because anyone can do it and you don t need any special equipment other than a properly fitting pair of sneakers.    4. Be a morning person. Studies show that people who exercise in the morning are more likely to stick with it. As Edinson explains,  Are you going to feel like exercising at the end of a hard day? Probably not. If you do your workout in the morning, you re not only more likely to do it, but you'll also set a positive tone for the day.    5. Ink the deal. Whether morning, afternoon, or evening, pick the time that is most convenient for you to exercise and write it down in your daily planner. Keep your exercise routine as you would keep any appointment.   6. Watch your step. Investing in a good pedometer can help you stay motivated.  If you have a pedometer attached to your waist and you can see how many steps you ve taken, you ll see it doesn t take long to walk 5,000 steps and you will be inspired,  Edinson says. And building up to 10,000 steps a day won t seem like such a daunting a task.    7. Hire the right help. While weight training is important, if you don t know what you re doing, you run the risk of injuring yourself or not being effective, Edinson says. It s best to get instructions from a  at the gym. You also can buy a weight-training DVD and follow along in your living room.   8. Keep records. Grab a diary or logbook, and every day that you exercise, write down what you did and for how long. Your records will make it easy for you to see what you ve accomplished and make you more accountable. Blank pages? You d be ashamed.   9. Phone a friend. Find someone who likes the same activity that you do -- walking in the neighborhood, riding bikes, playing tennis -- and make a date to do it together.  Exercising with a friend or in a group can be very motivating,  Ford says.  You are likely to walk longer or bike greater distances if you re talking to a friend along the way. The time will go by faster.  Don t have a morgan who is available? Grab an MP3 player and listen to your favorite music or an audio book while exercising.   10. Do what you like. Whatever exercise you choose, be sure it s one that you enjoy. You re more likely to stick with it if it s something you have fun doing rather than something you see as a chore, Ford says.   If you can t fit 40 minutes a day into your schedule, get more exercise simply by being less efficient with your chores and adding a little extra walking distance everywhere you go. However, if you pick an activity you like, finding time for fitness will become effortless and the rewards enormous.   BOOKS ON WEIGHT LOSS  A course in Weight Loss by Earnest Barrow    Eat, Drink, and Weigh Less by [place] researcher Jamal Gaona, and Carmen Odom    Ultrametabolism by Pollo Zamora. Recommend keeping Carbohydrates to  30-45g/meal and 15g/snack with fiber of at least 4g/serving, protein goal of 60-90g/day. Keep food diary on myfitnesspal.com.  Recommend pedometer with ultimate goal of 10,000 steps a day.     The Willpower Instinct by Silvia South.    Finding Your Ideal Weight  Most of the people in magazines and on TV are far slimmer than average, yet this is the  ideal  that many people aim for. Before you decide that you won t be happy until you get down to a certain number of pounds, consider:    Your age. You probably wish you could get back to your college weight. But normal changes in metabolism and hormone levels that occur with aging make it unrealistic.    Your gender. In general, men have more muscle and heavier bones than women, which means that healthy men usually weigh more than healthy women of the same height.    Your current weight. If you are very heavy, focus on losing a smaller amount (such as 10 percent of your body weight). Losing just 5 to 10 pounds can improve your health.  Your Body Fat Percentage  A pound of muscle weighs the same as a pound of fat, but it takes up less space. Think of a trained athlete and a  couch potato.  Even though they may be the same height and weight, the athlete looks fitter, is healthier, and probably wears a smaller size of clothing. If you are muscular, a body fat test may be a more accurate measure of your ideal weight than the bathroom scale. Talk to your healthcare provider, who can help you set appropriate goals for yourself.    Weight Management: Healthy Eating  Food is your body s fuel. You can t live without it. The key is to give your body enough nutrients and energy without eating too much. Reading food labels can help you make healthy choices. Also, learn new eating habits to manage your weight.      All the values on the label are based on one serving. The serving size is the average portion. Remember to multiply the values on the label by the number of servings you eat.   Eat Less Fat  A gram of fat has almost twice the calories of a gram of protein or carbohydrates. Try to balance your  food choices so that 20% to 35% of your calories comes from total fat. This means an average of 2  to 3  grams of fat for each 100 calories you eat.  Eat More Fiber  High-fiber foods are digested more slowly than low-fiber foods, so you feel full longer. Try to get 31 grams of fiber each day. Foods high in fiber include:    Vegetables and fruits    Whole-grain or bran breads, pastas, and cereals    Legumes (beans) and peas  As you begin to eat more fiber, be sure to drink plenty of water to keep your digestive system working smoothly.  Tips    Don t skip meals. This often leads to overeating later on. It s best to spread your eating throughout the day.    Eat a variety of foods, not just a few favorites.    If you find yourself eating when you re not hungry, ask yourself why. Many of us eat when we re bored, stressed, or just to be polite. Listen to your body. If you re not hungry, get busy doing something else instead of eating.    Eat slower. It takes 20 minutes for your stomach to tell your brain that it s full.    Pay attention to what you eat. Don t read or watch TV during your meal.      Weight Management: Exercise and Activity  Goal: at least 40 minutes daily  Studies show that people who exercise are the most likely to lose weight and keep it off. Exercise burns calories. It helps build muscle to make your body stronger. Make exercise part of your weight-management plan. You must hit moderate- to high cardiovascular workout for weight loss. Get your heart rate up to 75% to help you burn fat. Check online for free calculators.  Make Activity Part of Your Day  You may not think you have the time to exercise. But you can work activity into your daily life. Take 10 minutes out of your lunch hour to take a walk. Walk to the newsstand to get your paper instead of having it delivered. Make it a habit to take the stairs instead of the elevator. Park in the farthest parking spot instead of the closest. You ll be  surprised at how fast these little changes can make a difference.  The Benefits of Exercise    Exercise increases your metabolism (the speed at which your body burns calories).    Regular exercise can increase the amount of muscle in your body. Muscle burns calories faster than fat. The more muscle you have, the more calories you burn.    Exercise gives you energy and curbs your appetite.    Exercise decreases stress and helps you sleep better.  Make Exercise Fun  Exercise can be fun. Choose an activity you enjoy. You may even get a friend to do it with you.    Take a resistance-training or aerobics class    Join a team sport    Take a dance class    Walk the dog    Ride a bike  If you have health problems, be sure to ask your doctor before you start an exercise program. Have a  help you develop a plan that s safe for you.     7426-1994 [facility], [address] [place] [address], [place] [address]. All rights reserved. This information is not intended as a substitute for professional medical care. Always follow your healthcare professional's instructions.    OTHER AVENUES TO INCREASE PHYSICAL FITNESS    Mapping Walks and Runs, biking   www.mapmyrun.com   www.Stellaris.com    Rating walkability of a neighborhood   www.in3Dgallery    Treadmill Work Stations   www.WeLike    Free Workouts at home   www.Continental Coal.ProZyme    Walking, hiking, biking trails   www.ralistotrails.com    Active volunteer opportunities   www.MoveinBlue.Cinepapaya    Race or walk/run events in your area   www.active.ColibrÃ­    WorkoutFlipiture    www.M.A. Transportation Services/Web Performance/   www.WebTV.Ascenz    Adventure Vacations   www.Loop Commerce    Heart rate Monitors   www.polarusa.com    Pedometers   www.JoyTunes.Ascenz    Outdoor treasure hunts on your own   www.T2 Systems    7 Worst Junk Ingredients to Avoid   (If you don't know what it is, it probably isn't good to eat it!)  1. Sodium Nitrate (also called Sodium Nitrite)  This is a  preservative, coloring, and flavoring commonly found in processed meats like bryant, ham, hot dogs, cold cuts and smoked fish. Studies have shown that it reacts with the body s digestive acids to form a cancer-causing agent called nitrosamines. So double-check that  healthy  turkey for carcinogens before you gobble down your sandwich!  2. Aspartame (aka NutraSweet/Equal)  In scientific terms, this is a chemical combination of two amino acids and methanol. It s better known by the brand names NutraSweet and Equal, which are sweeteners found in countless  diet  desserts, drink mixes, and soft drinks. Aspartame was once thought to be a safe artificial sweetener, but it is now believed to cause cancer and neurological problems such as dizziness and hallucinations.  3. Acesulfame-K  This artificial sweetener is 200 times sweeter than sugar and is often found in chewing gum and soft drinks. When tested in the laboratory, it caused cancer in rats. And that makes this additive a lot less sweet in our opinion!  4. Artificial Food Colorings  There are food colorings being used that are linked with cancer in animal testing as well as behavioral disorders in children. These include Yellow 5, Red 40, Blue 1, Blue 2, Green 3, Orange B, Red 3 and Yellow 6. Amazingly, these colors have been banned in the [place] yet remain in many American foods. They can easily be avoided by choosing natural foods that aren t chemically or colorfully enhanced.  5. MSG  Monosodium Glutamate (MSG) is an amino acid used as a flavor enhancer in many soups, salad dressings, chips, frozen entrees and restaurant food. This nasty additive can alejandro with the nervous system causing side effects like migraines and overeating in some individuals. MSG appears on labels under several aliases, including yeast extract and calcium caseinate. It s even been found on the labels of organic products! Here s a list of the common aliases for MSG.    Monosodium  Glutamate    Hydrolyzed Vegetable Protein    Hydrolyzed Protein    Hydrolyzed Plant Protein    Plant Protein Extract    Sodium Caseinate    Calcium Caseinate    Yeast Extract    Textured Protein    Autolyzed Yeast    Hydrolyzed Oat Flour    6. Trans Fats  Trans fats cause heart disease. It s a proven fact. Before purchasing any packaged food, check the ingredients list. Even if the label boasts  0g trans fats  BEWARE the product may still contain up to a 0.5g of trans fats per serving, if you see the words partially hydrogenated oils on the ingredients list. It s important to avoid even the smallest amount because it can raise your bad cholesterol and lower your good cholesterol, making you susceptible to all kinds of health problems!  7. Sodium Benzoate  Sodium benzoate is a preservative used in many foods and beverages. This ingredient is known to cause hives, asthma and other allergic reactions in sensitive individuals. New research shows that it may also cause behavioral disorders in children. One more reason to avoid this harmful ingredient: When used in beverages that also contain ascorbic acid (vitamin C) it forms benzene, a known carcinogen. Some drink manufacturers are still using this toxic duo, so you may have benzene lurking in your favorite drink!          For constipation:   1. Consume at least 8 glasses of water per day   2. Exercise for at least 30 minutes every day. Regular exercise helps to keep your digestive system active and and healthy and may help with constipation.   3. Increase dietary fiber. Goal of 25 grams per day for women, 35 grams per day for men. If unable to consume 25-35 grams through diet alone consider OTC supplements such as Benefiber, FiberCon, Metamucil, or Citrucel.   4. Recommend taking Miralax (17 grams = 1 scoop). Take once daily, can mix with anything. If you experience increasing bowel movements and diarrhea, decrease to every other day or every 3rd day. Miralax is an  osmotic laxative that increases the amount of water secreted by the intestines resulting in softer and easier to pass stools.   5. Also recommend stimulant laxative such as Senna, Ex Lax or Dulcolax. Stimulant laxatives speed up the colonic motility of your gut helping to induce a bowel movement. Take as needed at bedtime.   6. If you are still having difficulties with constipation may also add a stool softener to your bowel regimen such a Docusate.           Patient Education     Excess Gas  Certain foods produce gas when digested. In some people, these foods make an excessive amount of gas. This may cause bloating, burping, or increased gas passing through the rectum (flatulence).     Foods that cause gas  The following foods are more likely to cause this problem. Limit them, or remove them from your diet:    Broccoli    Cauliflower    Red House sprouts    Cabbage    Cooked dried beans    Fizzy (carbonated) drinks, such as sparkling water, soda, beer, and champagne  Other causes  Other causes of excess gas include:    Eating too fast or talking while you chew. This may cause you to swallow air. This increases the amount of gas in your stomach. And it may make your symptoms worse. Chew each mouthful completely before you swallow. Take your time.    Chewing on gum or sucking on hard candy. These cause you to swallow more often. And some of what you are swallowing is air. This leads to more gas in your stomach. Avoid chewing gum and hard candy.    Overeating. This may increase the feeling of being bloated and cause more gas. When you are full, stop eating.     Being constipated. This can increase the amount of normal intestinal gas. Avoid constipation by getting more fiber in your diet. Good sources of fiber include whole-grain cereal, fresh vegetables (except those in the above list), and fresh fruits. High-fiber foods absorb water and carry it out of the body. When adding more fiber to your diet, you also need to  drink more water. You should drink at least 8, 8-ounce glasses of water (2 quarts) per day.  Date Last Reviewed: 8/1/2016 2000-2018 [employer]. [address] [place] [address], [place] [address]. All rights reserved. This information is not intended as a substitute for professional medical care. Always follow your healthcare professional's instructions.           Patient Education     Understanding Rectal Bleeding    Rectal bleeding is when blood passes through your rectum and anus. It can happen with or without a bowel movement. Rectal bleeding may be a sign of a serious problem in your rectum, colon, or upper GI tract. Call your healthcare provider right away if you have any rectal bleeding.  The GI Tract  The gastrointestinal (GI) tract includes the mouth, esophagus, stomach, small intestine, large intestine (colon), rectum, and anus. The food you eat is digested as it passes through the GI tract. Solid waste leaves the body through the rectum.   Rectal bleeding and GI problems  The cause of rectal bleeding may be found in any region of the GI tract. The colon or rectum may be the site of your bleeding problem. Or, bleeding may be due to problems farther up the GI tract, such as in the small intestine, duodenum, or stomach.  Causes of rectal bleeding  Rectal bleeding causes include the following:    Hemorrhoids (swollen veins in the rectum and anus)    Fissures (tears in or near the anus)    Diverticulosis (inflamed pockets in the colon wall)    Infection    Ischemia (low blood flow)    Radiation damage    Inflammatory bowel disease (Crohn's disease or ulcerative colitis)    Ulcers in the upper GI tract and inflammation of the large intestine    Abnormal tissue growths (tumors or polyps) in the GI tract    A bulging rectum (also called a rectal prolapse)    Abnormal blood vessels in the small intestine or in the colon  Common symptoms  Common symptoms include the following:    Rectal pain, itching, or  soreness    Belly pain or epigastric pain    Minor occasional drops of blood that appear on the stool or toilet paper, to greater amounts of stool that appear black or tarry   Rectal bleeding can also happen without pain.  Date Last Reviewed: 7/1/2016 2000-2018 [employer]. [address] [place] [address]. All rights reserved. This information is not intended as a substitute for professional medical care. Always follow your healthcare professional's instructions.           Patient Education   Coping with Constipation  What is constipation?  If you have hard, dry stools that are difficult to pass, you have constipation. You may also have bloating, gas and stomach cramps.  How is it treated?  If the problem is severe, your care team can suggest medicine to relieve it (a laxative, stool softener or enema). Do not use medicine unless your care team tells you to.  You should not use an enema (rectal wash) if your white blood cell or platelet count is low.  What else can I do to treat or prevent constipation?    Eat at the same times each day.    Add fiber to your diet by eating:  ? Whole grain breads, cereals and pastas  ? Whole grains such as barley or brown rice  ? Raw vegetables  ? Fresh and dried fruits  ? Dried beans and peas  ? Nuts, seeds and popcorn.    Drink lots of fluids: at least eight to ten 8-ounce glasses each day. Try water, prune juice, warm juices, herbal teas and lemonade.    Have a hot drink with high-fiber foods for breakfast.    Eat the skins on fruits and potatoes. Wash them well before eating.    Add wheat bran to cereals, casseroles and homemade breads.    If gas is a problem:  ? Avoid gas-forming foods such carbonated (fizzy) drinks, broccoli, cabbage, cauliflower, dried beans and peas, peppers and onions.  ? Do not use a straw.  ? Do not chew gum.    Stay active. Simply getting out for a walk can help. Increase your exercise as you are able.    Try to use the toilet  at the same times each day. Keep a record of your bowel movements.    If you take medicine that may cause constipation, your doctor may ask you to take a stool softener.  When should I call my care team?  Call your care team if:    You do not have a bowel movement for two or more days.    You have sudden, severe belly pain.    You notice blood in your stool.    You have severe hemorrhoids (swelling, itching and pain around the anus).  Comments:  __________________________________________  __________________________________________  __________________________________________  __________________________________________  __________________________________________  __________________________________________  __________________________________________  __________________________________________  __________________________________________  __________________________________________  For informational purposes only. Not to replace the advice of your health care provider. Copyright   2006 [facility]. All rights reserved. Clinically reviewed by [facility]. [employer] [id number] - REV 04/19.         HOW TO BE HEALTHIER    Tools to use: [employer] www.ApptheGame- FREE website that helps you with food choices, and exercise. You can talk with people, talk to trainers and dieticians.    Increase your water intake daily to 6 glasses     Purchase a pedometer that will monitor how many steps you take (10, 000 daily is a goal for everyone)- these are sometimes included in smart phones.    Use meal replacements such as Janene's meals, Lean Cuisines, Healthy Choice, Smart Ones, Weight Watchers Meals, Slim Fast and Glucerna shakes and supplement with fresh fruits and vegetables      Please drink a lot of water daily. Most people typically need about 2 liters of water daily and more if they are exercising, have a large weight, or have a fever or illness. Add Crystal Light for flavoring if desired. But no pop with  calories in it.      Keep a food journal of what you eat, calories in what you eat, and mood. You will see where you are getting the majority of your calories. Bring the journal with you to your nutrition appointment (if you are being referred).      Fitness apps: MyFitnessPal, LifeFitness, Fitbit, Sworkit Fitness(simple to-go fitness), High Intensity HIIT workouts,  NEOU (life and on-demand videos,       Food apps: Weight Watchers, SparkRecipes, LoseIt, Tap&Track, Fooducate, and HealthyOut (restaurant food tracking).      Focus on wet volumetrics, meaning, eat more foods that are high in water and fiber such as fruits and vegetables in order to get that full feeling. These are also good for your overall health as well.      Food delivery Let's [facility] to help you prepare meals for you and your family. Often times, the caloric and nutrition data and serving sizes are available for this food. This can be a time saving maneuver. The website can give you more information http://www.[employer]/. [employer] has and delivers Let's Dish & fresh low calorie salads      Food delivery Home  [email or web address]      Food delivery Green  www.greenchef.com      Food delivery Hello Fresh at https://www.LaunchLablo[employer]/food-boxes/classic-box/      Try Cooking Light recipes for low calorie meal preparation and planning      Diabetic carb counting: CalorieKing   http://www.calorieking.com/      Other food plan options you can search for on the internet and check out include: Willy DUARTE, [facility]    Here are 10 ways to get you moving more often:   11. Be less efficient. People typically try to think of ways to make daily tasks easier. But if we make them harder, we can get more exercise, says Staci Neves MS, of Chris Gonzalez, a , , and spokeswoman for the [employer] [place] on Exercise (ACE).  Bring in the groceries from your car one bag at a time so you have to make several  trips,  Edinson says.  Put the laundry away a few items at a time, rather than carrying it up in a basket.    12. Shun labor-saving devices. Wash the car by hand rather than taking it to the car wash.  It takes about an hour and a half to do a good job, and in the meantime you ve gotten great exercise,  Edinson says. Use a push mower rather than a riding mower to groom your lawn.   13. Going somewhere? Take the long way. Walking up or down a few flights of stairs each day can be good for your heart. Avoid elevators and escalators whenever possible. If you ride the bus or subway to work, get off a stop before your office and walk the extra distance. When you go to the mall or the grocery store, park furthest from the entrance, not as close to it as you can, and you'll get a few extra minutes of walking -- one of the best exercises there is, Dr. Youngblood says.  Walking is great because anyone can do it and you don t need any special equipment other than a properly fitting pair of sneakers.    14. Be a morning person. Studies show that people who exercise in the morning are more likely to stick with it. As Edinson explains,  Are you going to feel like exercising at the end of a hard day? Probably not. If you do your workout in the morning, you re not only more likely to do it, but you'll also set a positive tone for the day.    15. Ink the deal. Whether morning, afternoon, or evening, pick the time that is most convenient for you to exercise and write it down in your daily planner. Keep your exercise routine as you would keep any appointment.   16. Watch your step. Investing in a good pedometer can help you stay motivated.  If you have a pedometer attached to your waist and you can see how many steps you ve taken, you ll see it doesn t take long to walk 5,000 steps and you will be inspired,  Edinson says. And building up to 10,000 steps a day won t seem like such a daunting a task.   17. Hire the right help. While  weight training is important, if you don t know what you re doing, you run the risk of injuring yourself or not being effective, Edinson says. It s best to get instructions from a  at the gym. You also can buy a weight-training DVD and follow along in your living room.   18. Keep records. Grab a diary or logbook, and every day that you exercise, write down what you did and for how long. Your records will make it easy for you to see what you ve accomplished and make you more accountable. Blank pages? You d be ashamed.   19. Phone a friend. Find someone who likes the same activity that you do -- walking in the neighborhood, riding bikes, playing tennis -- and make a date to do it together.  Exercising with a friend or in a group can be very motivating,  Ford says.  You are likely to walk longer or bike greater distances if you re talking to a friend along the way. The time will go by faster.  Don t have a morgan who is available? Grab an MP3 player and listen to your favorite music or an audio book while exercising.   20. Do what you like. Whatever exercise you choose, be sure it s one that you enjoy. You re more likely to stick with it if it s something you have fun doing rather than something you see as a chore, Ford says.   If you can t fit 40 minutes a day into your schedule, get more exercise simply by being less efficient with your chores and adding a little extra walking distance everywhere you go. However, if you pick an activity you like, finding time for fitness will become effortless and the rewards enormous.   BOOKS ON WEIGHT LOSS  A course in Weight Loss by Earnest Barrow    Eat, Drink, and Weigh Less by [employer] researcher Jamal Gaona, and Carmen Odom    Ultrametabolism by Pollo Zamora. Recommend keeping Carbohydrates to  30-45g/meal and 15g/snack with fiber of at least 4g/serving, protein goal of 60-90g/day. Keep food diary on myfitnesspal.com. Recommend pedometer with  ultimate goal of 10,000 steps a day.     The Willpower Instinct by Silvia South.    Finding Your Ideal Weight  Most of the people in magazines and on TV are far slimmer than average, yet this is the  ideal  that many people aim for. Before you decide that you won t be happy until you get down to a certain number of pounds, consider:    Your age. You probably wish you could get back to your college weight. But normal changes in metabolism and hormone levels that occur with aging make it unrealistic.    Your gender. In general, men have more muscle and heavier bones than women, which means that healthy men usually weigh more than healthy women of the same height.    Your current weight. If you are very heavy, focus on losing a smaller amount (such as 10 percent of your body weight). Losing just 5 to 10 pounds can improve your health.  Your Body Fat Percentage  A pound of muscle weighs the same as a pound of fat, but it takes up less space. Think of a trained athlete and a  couch potato.  Even though they may be the same height and weight, the athlete looks fitter, is healthier, and probably wears a smaller size of clothing. If you are muscular, a body fat test may be a more accurate measure of your ideal weight than the bathroom scale. Talk to your healthcare provider, who can help you set appropriate goals for yourself.    Weight Management: Healthy Eating  Food is your body s fuel. You can t live without it. The key is to give your body enough nutrients and energy without eating too much. Reading food labels can help you make healthy choices. Also, learn new eating habits to manage your weight.      All the values on the label are based on one serving. The serving size is the average portion. Remember to multiply the values on the label by the number of servings you eat.   Eat Less Fat  A gram of fat has almost twice the calories of a gram of protein or carbohydrates. Try to balance your food choices so that 20%  to 35% of your calories comes from total fat. This means an average of 2  to 3  grams of fat for each 100 calories you eat.  Eat More Fiber  High-fiber foods are digested more slowly than low-fiber foods, so you feel full longer. Try to get 31 grams of fiber each day. Foods high in fiber include:    Vegetables and fruits    Whole-grain or bran breads, pastas, and cereals    Legumes (beans) and peas  As you begin to eat more fiber, be sure to drink plenty of water to keep your digestive system working smoothly.  Tips    Don t skip meals. This often leads to overeating later on. It s best to spread your eating throughout the day.    Eat a variety of foods, not just a few favorites.    If you find yourself eating when you re not hungry, ask yourself why. Many of us eat when we re bored, stressed, or just to be polite. Listen to your body. If you re not hungry, get busy doing something else instead of eating.    Eat slower. It takes 20 minutes for your stomach to tell your brain that it s full.    Pay attention to what you eat. Don t read or watch TV during your meal.      Weight Management: Exercise and Activity  Goal: at least 40 minutes daily  Studies show that people who exercise are the most likely to lose weight and keep it off. Exercise burns calories. It helps build muscle to make your body stronger. Make exercise part of your weight-management plan. You must hit moderate- to high cardiovascular workout for weight loss. Get your heart rate up to 75% to help you burn fat. Check online for free calculators.  Make Activity Part of Your Day  You may not think you have the time to exercise. But you can work activity into your daily life. Take 10 minutes out of your lunch hour to take a walk. Walk to the newsstand to get your paper instead of having it delivered. Make it a habit to take the stairs instead of the elevator. Park in the farthest parking spot instead of the closest. You ll be surprised at how fast these  little changes can make a difference.  The Benefits of Exercise    Exercise increases your metabolism (the speed at which your body burns calories).    Regular exercise can increase the amount of muscle in your body. Muscle burns calories faster than fat. The more muscle you have, the more calories you burn.    Exercise gives you energy and curbs your appetite.    Exercise decreases stress and helps you sleep better.  Make Exercise Fun  Exercise can be fun. Choose an activity you enjoy. You may even get a friend to do it with you.    Take a resistance-training or aerobics class    Join a team sport    Take a dance class    Walk the dog    Ride a bike  If you have health problems, be sure to ask your doctor before you start an exercise program. Have a  help you develop a plan that s safe for you.     9653-7719 [facility], [address] [place] [address], [place] [id number]. All rights reserved. This information is not intended as a substitute for professional medical care. Always follow your healthcare professional's instructions.    OTHER AVENUES TO INCREASE PHYSICAL FITNESS    Mapping Walks and Runs, biking   www.mapmyrun.com   www.Skipola.com    Rating walkability of a neighborhood   www.sougou    Treadmill Work Stations   www.BankerBay Technologies    Free Workouts at home   www.Testin.[employer]    Walking, hiking, biking trails   www.ralistotrails.com    Active volunteer opportunities   www.ZAO Begun.org    Race or walk/run events in your area   www.active.Deep-Secure    WorkoutmusDispop    www.[employer]iness Inc/Kontron/   www.[employer]ostics.RocketBolt    Adventure Vacations   www.iPowerUp    Heart rate Monitors   www.polarusa.com    Pedometers   www.Oktogo.com    Outdoor treasure hunts on your own   www.[employer].RocketBolt    7 Worst Junk Ingredients to Avoid   (If you don't know what it is, it probably isn't good to eat it!)  1. Sodium Nitrate (also called Sodium Nitrite)  This is a preservative, coloring, and  flavoring commonly found in processed meats like bryant, ham, hot dogs, cold cuts and smoked fish. Studies have shown that it reacts with the body s digestive acids to form a cancer-causing agent called nitrosamines. So double-check that  healthy  turkey for carcinogens before you gobble down your sandwich!  2. Aspartame (aka NutraSweet/Equal)  In scientific terms, this is a chemical combination of two amino acids and methanol. It s better known by the brand names NutraSweet and Equal, which are sweeteners found in countless  diet  desserts, drink mixes, and soft drinks. Aspartame was once thought to be a safe artificial sweetener, but it is now believed to cause cancer and neurological problems such as dizziness and hallucinations.  3. Acesulfame-K  This artificial sweetener is 200 times sweeter than sugar and is often found in chewing gum and soft drinks. When tested in the laboratory, it caused cancer in rats. And that makes this additive a lot less sweet in our opinion!  4. Artificial Food Colorings  There are food colorings being used that are linked with cancer in animal testing as well as behavioral disorders in children. These include Yellow 5, Red 40, Blue 1, Blue 2, Green 3, Orange B, Red 3 and Yellow 6. Amazingly, these colors have been banned in the [place] yet remain in many American foods. They can easily be avoided by choosing natural foods that aren t chemically or colorfully enhanced.  5. MSG  Monosodium Glutamate (MSG) is an amino acid used as a flavor enhancer in many soups, salad dressings, chips, frozen entrees and restaurant food. This nasty additive can alejandro with the nervous system causing side effects like migraines and overeating in some individuals. MSG appears on labels under several aliases, including yeast extract and calcium caseinate. It s even been found on the labels of organic products! Here s a list of the common aliases for MSG.    Monosodium Glutamate    Hydrolyzed  "Vegetable Protein    Hydrolyzed Protein    Hydrolyzed Plant Protein    Plant Protein Extract    Sodium Caseinate    Calcium Caseinate    Yeast Extract    Textured Protein    Autolyzed Yeast    Hydrolyzed Oat Flour    6. Trans Fats  Trans fats cause heart disease. It s a proven fact. Before purchasing any packaged food, check the ingredients list. Even if the label boasts  0g trans fats  BEWARE the product may still contain up to a 0.5g of trans fats per serving, if you see the words partially hydrogenated oils on the ingredients list. It s important to avoid even the smallest amount because it can raise your bad cholesterol and lower your good cholesterol, making you susceptible to all kinds of health problems!  7. Sodium Benzoate  Sodium benzoate is a preservative used in many foods and beverages. This ingredient is known to cause hives, asthma and other allergic reactions in sensitive individuals. New research shows that it may also cause behavioral disorders in children. One more reason to avoid this harmful ingredient: When used in beverages that also contain ascorbic acid (vitamin C) it forms benzene, a known carcinogen. Some drink manufacturers are still using this toxic duo, so you may have benzene lurking in your favorite drink!                COUNSELING:   Reviewed preventive health counseling, as reflected in patient instructions    Estimated body mass index is 36.57 kg/m  as calculated from the following:    Height as of this encounter: 1.715 m (5' 7.5\").    Weight as of this encounter: 107.5 kg (237 lb).     Weight management plan: Discussed healthy diet and exercise guidelines     reports that he has never smoked. He has never used smokeless tobacco.      Counseling Resources:  ATP IV Guidelines  Pooled Cohorts Equation Calculator  FRAX Risk Assessment  ICSI Preventive Guidelines  Dietary Guidelines for Americans, 2010  USDA's MyPlate  ASA Prophylaxis  Lung CA Screening    Mari Galvan, " [facility]

## 2019-11-29 PROBLEM — K92.1 HEMATOCHEZIA: Status: ACTIVE | Noted: 2019-11-29

## 2019-11-29 PROBLEM — G44.219 EPISODIC TENSION-TYPE HEADACHE, NOT INTRACTABLE: Status: ACTIVE | Noted: 2019-11-29

## 2019-11-29 NOTE — RESULT ENCOUNTER NOTE
CBC- normal  CMP- normal  Please call him with these results. Send a hard copy for their records.   Thanks. AUGUSTINE Haro

## 2020-01-10 ENCOUNTER — ANESTHESIA EVENT (OUTPATIENT)
Dept: GASTROENTEROLOGY | Facility: CLINIC | Age: 40
End: 2020-01-10
Payer: COMMERCIAL

## 2020-01-10 NOTE — ANESTHESIA PREPROCEDURE EVALUATION
Anesthesia Pre-Procedure Evaluation    Patient: Sergio Us   MRN: [id number] : 1980          Preoperative Diagnosis: Slow transit constipation [K59.01]  Hematochezia [K92.1]    Procedure(s):  COLONOSCOPY    No past medical history on file.  No past surgical history on file.    Anesthesia Evaluation     . Pt has not had prior anesthetic            ROS/MED HX    ENT/Pulmonary:  - neg pulmonary ROS     Neurologic:  - neg neurologic ROS     Cardiovascular:  - neg cardiovascular ROS       METS/Exercise Tolerance:     Hematologic:         Musculoskeletal:         GI/Hepatic:     (+) Other GI/Hepatic       Renal/Genitourinary:         Endo:     (+) Obesity, .      Psychiatric:         Infectious Disease:         Malignancy:         Other:                          Physical Exam  Normal systems: cardiovascular, pulmonary and dental    Airway   Mallampati: I  TM distance: >3 FB  Neck ROM: full    Dental     Cardiovascular   Rhythm and rate: regular and normal      Pulmonary    breath sounds clear to auscultation            Lab Results   Component Value Date    WBC 7.3 2019    HGB 15.4 2019    HCT 43.6 2019     2019    CRP 19.7 (H) 2016    SED 14 2016     2019    POTASSIUM 4.2 2019    CHLORIDE 108 2019    CO2 26 2019    BUN 9 2019    CR 0.72 2019    GLC 98 2019    CHARANJIT 9.3 2019    ALBUMIN 4.3 2019    PROTTOTAL 7.8 2019    ALT 30 2019    AST 16 2019    ALKPHOS 71 2019    BILITOTAL 0.6 2019    TSH 1.36 2018       Preop Vitals  BP Readings from Last 3 Encounters:   19 120/72   19 108/60   18 120/62    Pulse Readings from Last 3 Encounters:   19 80   19 80   18 72      Resp Readings from Last 3 Encounters:   19 16   19 20   18 12    SpO2 Readings from Last 3 Encounters:   19 98%   18 100%   17 99%      Temp  "Readings from Last 1 Encounters:   11/27/19 36.8  C (98.2  F) (Tympanic)    Ht Readings from Last 1 Encounters:   11/27/19 1.715 m (5' 7.5\")      Wt Readings from Last 1 Encounters:   11/27/19 107.5 kg (237 lb)    Estimated body mass index is 36.57 kg/m  as calculated from the following:    Height as of 11/27/19: 1.715 m (5' 7.5\").    Weight as of 11/27/19: 107.5 kg (237 lb).       Anesthesia Plan      History & Physical Review  History and physical reviewed and following examination; no interval change.    ASA Status:  2 .    NPO Status:  > 6 hours    Plan for General and MAC Reason for MAC:  Deep or markedly invasive procedure (G8)         Postoperative Care      Consents  Anesthetic plan, risks, benefits and alternatives discussed with:  Patient..                 JASBIR Mcguire CRNA  "

## 2020-01-13 ENCOUNTER — ANESTHESIA (OUTPATIENT)
Dept: GASTROENTEROLOGY | Facility: CLINIC | Age: 40
End: 2020-01-13
Payer: COMMERCIAL

## 2020-01-13 ENCOUNTER — HOSPITAL ENCOUNTER (OUTPATIENT)
Facility: CLINIC | Age: 40
Discharge: HOME | End: 2020-01-13
Attending: SURGERY | Admitting: SURGERY
Payer: COMMERCIAL

## 2020-01-13 VITALS
SYSTOLIC BLOOD PRESSURE: 124 MMHG | TEMPERATURE: 98 F | BODY MASS INDEX: 38.45 KG/M2 | HEIGHT: 67 IN | OXYGEN SATURATION: 99 % | DIASTOLIC BLOOD PRESSURE: 65 MMHG | WEIGHT: 245 LBS | RESPIRATION RATE: 16 BRPM | HEART RATE: 69 BPM

## 2020-01-13 LAB — COLONOSCOPY: NORMAL

## 2020-01-13 PROCEDURE — 37000008 ZZH ANESTHESIA TECHNICAL FEE, 1ST 30 MIN: Performed by: SURGERY

## 2020-01-13 PROCEDURE — 45378 DIAGNOSTIC COLONOSCOPY: CPT | Performed by: SURGERY

## 2020-01-13 PROCEDURE — 25800030 ZZH RX IP 258 OP 636: Performed by: SURGERY

## 2020-01-13 PROCEDURE — 25000128 H RX IP 250 OP 636: Performed by: NURSE ANESTHETIST, CERTIFIED REGISTERED

## 2020-01-13 RX ORDER — SODIUM CHLORIDE, SODIUM LACTATE, POTASSIUM CHLORIDE, CALCIUM CHLORIDE 600; 310; 30; 20 MG/100ML; MG/100ML; MG/100ML; MG/100ML
INJECTION, SOLUTION INTRAVENOUS CONTINUOUS
Status: DISCONTINUED | OUTPATIENT
Start: 2020-01-13 | End: 2020-01-13 | Stop reason: HOSPADM

## 2020-01-13 RX ORDER — PROPOFOL 10 MG/ML
INJECTION, EMULSION INTRAVENOUS PRN
Status: DISCONTINUED | OUTPATIENT
Start: 2020-01-13 | End: 2020-01-13

## 2020-01-13 RX ADMIN — PROPOFOL 150 MG: 10 INJECTION, EMULSION INTRAVENOUS at 13:20

## 2020-01-13 RX ADMIN — PROPOFOL 150 MG: 10 INJECTION, EMULSION INTRAVENOUS at 13:18

## 2020-01-13 RX ADMIN — PROPOFOL 150 MG: 10 INJECTION, EMULSION INTRAVENOUS at 13:19

## 2020-01-13 RX ADMIN — PROPOFOL 100 MG: 10 INJECTION, EMULSION INTRAVENOUS at 13:21

## 2020-01-13 RX ADMIN — SODIUM CHLORIDE, POTASSIUM CHLORIDE, SODIUM LACTATE AND CALCIUM CHLORIDE: 600; 310; 30; 20 INJECTION, SOLUTION INTRAVENOUS at 11:51

## 2020-01-13 RX ADMIN — SODIUM CHLORIDE, POTASSIUM CHLORIDE, SODIUM LACTATE AND CALCIUM CHLORIDE: 600; 310; 30; 20 INJECTION, SOLUTION INTRAVENOUS at 13:30

## 2020-01-13 RX ADMIN — PROPOFOL 100 MG: 10 INJECTION, EMULSION INTRAVENOUS at 13:23

## 2020-01-13 RX ADMIN — PROPOFOL 150 MG: 10 INJECTION, EMULSION INTRAVENOUS at 13:17

## 2020-01-13 ASSESSMENT — MIFFLIN-ST. JEOR: SCORE: 1994.94

## 2020-01-13 NOTE — ANESTHESIA POSTPROCEDURE EVALUATION
Patient: Sergio Us    Procedure(s):  COLONOSCOPY    Diagnosis:Slow transit constipation [K59.01]  Hematochezia [K92.1]  Diagnosis Additional Information: No value filed.    Anesthesia Type:  General, MAC    Note:  Anesthesia Post Evaluation    Patient location during evaluation: Bedside  Patient participation: Able to fully participate in evaluation  Level of consciousness: awake and alert  Pain management: adequate  multimodal analgesia used between 6 hours prior to anesthesia start to PACU dischargeAirway patency: patent  Cardiovascular status: acceptable  Respiratory status: acceptable  two or more mitigation strategies used for obstructive sleep apneaHydration status: acceptable  PONV: none     Anesthetic complications: None          Last vitals:  Vitals:    01/13/20 1118 01/13/20 1345   BP: 132/83 (!) 145/64   Pulse: 76 80   Resp: 18 16   Temp: 36.7  C (98  F)    SpO2: 99% 95%         Electronically Signed By: JASBIR Mcguire CRNA  January 13, 2020  2:04 PM

## 2020-01-13 NOTE — H&P
[facility]    Pre-Endoscopy History and Physical     Sergio Us MRN# [id number]   YOB: 1980 Age: 39 year old     Date of Procedure: 2020  Primary care provider: Mari Galvan  Type of Endoscopy: Colonoscopy with possible biopsy, possible polypectomy  Reason for Procedure: BRBPR  Type of Anesthesia Anticipated: MAC    HPI:    Sergio is a 39 year old male who will be undergoing the above procedure.  diagnostic colonoscopy - blood per rectum    A history and physical has been performed. The patient's medications and allergies have been reviewed. The risks and benefits of the procedure and the sedation options and risks were discussed with the patient.  All questions were answered and informed consent was obtained.      He denies a personal or family history of anesthesia complications or bleeding disorders.     Patient Active Problem List   Diagnosis     Slow transit constipation     Family history of diabetes mellitus     Family history of thyroid disorder     CARDIOVASCULAR SCREENING; LDL GOAL LESS THAN 130     Class 2 obesity due to excess calories without serious comorbidity with body mass index (BMI) of 36.0 to 36.9 in adult     ACP (advance care planning)     Patient is [employer]     Episodic tension-type headache, not intractable     Hematochezia        History reviewed. No pertinent past medical history.     History reviewed. No pertinent surgical history.    Social History     Tobacco Use     Smoking status: Never Smoker     Smokeless tobacco: Never Used   Substance Use Topics     Alcohol use: Yes     Comment: rare       Family History   Problem Relation Age of Onset     Myocardial Infarction Father 44             Heart Failure Father      Diabetes Brother         type 1     Diabetes Mother         type 2     Cerebrovascular Disease Mother      Thyroid Disease Mother      Cancer Other         multiple in the extended family, unsure of  "types     Cancer Maternal Grandmother         passed from this     No Known Problems Maternal Grandfather      Cancer Paternal Grandmother         passed from that     Brain Cancer Maternal Aunt      Heart Failure Paternal Grandfather         56 yo       Prior to Admission medications    Medication Sig Start Date End Date Taking? Authorizing Provider   Ascorbic Acid (VITAMIN C PO)    Yes Reported, Patient   B Complex Vitamins (B COMPLEX PO)    Yes Reported, Patient   Cholecalciferol (VITAMIN D3 PO) Take by mouth daily   Yes Reported, Patient   docusate sodium (COLACE) 100 MG capsule Take 1 capsule (100 mg) by mouth daily 11/27/19  Yes Mari Galvan, CNP   glucosamine-chondroitin 500-400 MG CAPS Take 1 capsule by mouth daily   Yes Reported, Patient   Inulin (FIBER CHOICE PO)    Yes Reported, Patient       No Known Allergies     REVIEW OF SYSTEMS:   5 point ROS negative except as noted above in HPI, including Gen., Resp., CV, GI &  system review.    PHYSICAL EXAM:   /83 (BP Location: Right arm)   Pulse 76   Temp 98  F (36.7  C) (Oral)   Resp 18   Ht 1.702 m (5' 7\")   Wt 111.1 kg (245 lb)   SpO2 99%   BMI 38.37 kg/m   Estimated body mass index is 38.37 kg/m  as calculated from the following:    Height as of this encounter: 1.702 m (5' 7\").    Weight as of this encounter: 111.1 kg (245 lb).   Constitutional: Awake, alert, no acute distress.  Eyes: No scleral icterus.  Conjunctiva are without injection.  ENMT: Mucous membranes moist, dentition and gums are intact.   Neck: Soft, supple, trachea midline.    Endocrine: n/a   Lymphatic: There is no cervical, submandibularadenopathy.  Respiratory: Lungs are clear to auscultation and percussion bilaterally.   Cardiovascular: Regular rate and rhythm. No murmurs, rubs, or gallops.    Abdomen: Non-distended, non-tender, normoactive bowel sounds present, No masses,  Musculoskeletal: No spinal or CVA tenderness. Full range of motion in the upper and lower " extremities.    Skin: No skin rashes or lesions to inspection.  No petechia.    Neurologic: Cranial nerves II through XII are grossly intact and symmetric.  Psychiatric: The patient is alert and oriented times 3.  The patient's affect is not blunted and mood is appropriate.  DIAGNOSTICS:    Not indicated    IMPRESSION   ASA Class 1 - Healthy patient, no medical problems    PLAN:   Plan for Colonoscopy with possible biopsy, possible polypectomy. We discussed the risks, benefits and alternatives and the patient wished to proceed.  Patient is cleared for the above procedure.    The above has been forwarded to the consulting provider.    [facility], [facility]

## 2020-01-13 NOTE — DISCHARGE INSTRUCTIONS
MEDICAL CENTER    Home Care Following Endoscopy    Dr. Pérezh Garcia      Activity:    You have just undergone an endoscopic procedure usually performed with Monitored Anesthesia care.  Do not work or operate machinery (including a car) for at least 12 hours.      I encourage you to walk and attempt to pass this air as soon as possible.    Diet:    Return to the diet you were on before your procedure but eat lightly for the first 12-24 hours.    Drink plenty of water.    Resume any regular medications unless otherwise advised by your physician.  Please begin any new medication prescribed as a result of your procedure as directed by your physician.     If you had any biopsy or polyp removed please refrain from aspirin or aspirin products for 2 days.  If on Coumadin please restart as instructed by your physician.   Pain:    You may take Tylenol as needed for pain.  Expected Recovery:    You can expect some mild abdominal fullness and/or discomfort due to the air used to inflate your intestinal tract. It is also normal to have a mild sore throat after upper endoscopy.    Call Your Physician if You Have:    After Upper Endoscopy:  o Shoulder, back or chest pain.  o Difficulty breathing or swallowing.  o Vomiting blood.    After Colonoscopy:  o Worsening persisting abdominal pain which is worse with activity.  o Fevers (>101 degrees F), chills or shakes.  o Passage of continued blood with bowel movements.   Any questions or concerns about your recovery, please call [phone] or after hours [phone] Nurse Advice Line.    Follow-up Care:    You should receive a call or letter with your results within 2-3 week. Please call if you have not received a notification of your results.    If asked to return to clinic please make an appointment 1 week after your procedure.  Call [phone].

## 2020-01-16 PROBLEM — K57.30 DIVERTICULOSIS OF SIGMOID COLON: Status: ACTIVE | Noted: 2020-01-16

## 2020-01-16 PROBLEM — K92.1 HEMATOCHEZIA: Status: RESOLVED | Noted: 2019-11-29 | Resolved: 2020-01-16

## 2020-01-16 PROBLEM — K57.30 DIVERTICULOSIS OF SIGMOID COLON: Chronic | Status: ACTIVE | Noted: 2020-01-16

## 2020-08-12 ENCOUNTER — OFFICE VISIT (OUTPATIENT)
Dept: FAMILY MEDICINE | Facility: CLINIC | Age: 40
End: 2020-08-12
Payer: COMMERCIAL

## 2020-08-12 VITALS
OXYGEN SATURATION: 97 % | HEART RATE: 74 BPM | RESPIRATION RATE: 16 BRPM | DIASTOLIC BLOOD PRESSURE: 68 MMHG | HEIGHT: 67 IN | SYSTOLIC BLOOD PRESSURE: 118 MMHG | TEMPERATURE: 97.8 F | WEIGHT: 263 LBS | BODY MASS INDEX: 41.28 KG/M2

## 2020-08-12 DIAGNOSIS — R21 RASH AND NONSPECIFIC SKIN ERUPTION: ICD-10-CM

## 2020-08-12 DIAGNOSIS — R53.83 FATIGUE, UNSPECIFIED TYPE: ICD-10-CM

## 2020-08-12 DIAGNOSIS — Z00.00 ROUTINE GENERAL MEDICAL EXAMINATION AT A HEALTH CARE FACILITY: Primary | ICD-10-CM

## 2020-08-12 DIAGNOSIS — M25.50 ARTHRALGIA, UNSPECIFIED JOINT: ICD-10-CM

## 2020-08-12 LAB
ALBUMIN SERPL-MCNC: 4 G/DL (ref 3.4–5)
ALP SERPL-CCNC: 81 U/L (ref 40–150)
ALT SERPL W P-5'-P-CCNC: 48 U/L (ref 0–70)
ANION GAP SERPL CALCULATED.3IONS-SCNC: 5 MMOL/L (ref 3–14)
AST SERPL W P-5'-P-CCNC: 23 U/L (ref 0–45)
BILIRUB SERPL-MCNC: 0.4 MG/DL (ref 0.2–1.3)
BUN SERPL-MCNC: 9 MG/DL (ref 7–30)
CALCIUM SERPL-MCNC: 8.8 MG/DL (ref 8.5–10.1)
CHLORIDE SERPL-SCNC: 108 MMOL/L (ref 94–109)
CK SERPL-CCNC: 60 U/L (ref 30–300)
CO2 SERPL-SCNC: 27 MMOL/L (ref 20–32)
CREAT SERPL-MCNC: 0.84 MG/DL (ref 0.66–1.25)
CRP SERPL-MCNC: <2.9 MG/L (ref 0–8)
ERYTHROCYTE [DISTWIDTH] IN BLOOD BY AUTOMATED COUNT: 12.4 % (ref 10–15)
ERYTHROCYTE [SEDIMENTATION RATE] IN BLOOD BY WESTERGREN METHOD: 6 MM/H (ref 0–15)
GFR SERPL CREATININE-BSD FRML MDRD: >90 ML/MIN/{1.73_M2}
GLUCOSE SERPL-MCNC: 99 MG/DL (ref 70–99)
HCT VFR BLD AUTO: 43.6 % (ref 40–53)
HGB BLD-MCNC: 15 G/DL (ref 13.3–17.7)
MCH RBC QN AUTO: 30 PG (ref 26.5–33)
MCHC RBC AUTO-ENTMCNC: 34.4 G/DL (ref 31.5–36.5)
MCV RBC AUTO: 87 FL (ref 78–100)
PLATELET # BLD AUTO: 233 10E9/L (ref 150–450)
POTASSIUM SERPL-SCNC: 4.4 MMOL/L (ref 3.4–5.3)
PROT SERPL-MCNC: 7.7 G/DL (ref 6.8–8.8)
RBC # BLD AUTO: 5 10E12/L (ref 4.4–5.9)
SODIUM SERPL-SCNC: 140 MMOL/L (ref 133–144)
TSH SERPL DL<=0.005 MIU/L-ACNC: 2.43 MU/L (ref 0.4–4)
WBC # BLD AUTO: 6.3 10E9/L (ref 4–11)

## 2020-08-12 PROCEDURE — 86618 LYME DISEASE ANTIBODY: CPT | Performed by: NURSE PRACTITIONER

## 2020-08-12 PROCEDURE — 86431 RHEUMATOID FACTOR QUANT: CPT | Performed by: NURSE PRACTITIONER

## 2020-08-12 PROCEDURE — 86140 C-REACTIVE PROTEIN: CPT | Performed by: NURSE PRACTITIONER

## 2020-08-12 PROCEDURE — 82550 ASSAY OF CK (CPK): CPT | Performed by: NURSE PRACTITIONER

## 2020-08-12 PROCEDURE — 85027 COMPLETE CBC AUTOMATED: CPT | Performed by: NURSE PRACTITIONER

## 2020-08-12 PROCEDURE — 84443 ASSAY THYROID STIM HORMONE: CPT | Performed by: NURSE PRACTITIONER

## 2020-08-12 PROCEDURE — 85652 RBC SED RATE AUTOMATED: CPT | Performed by: NURSE PRACTITIONER

## 2020-08-12 PROCEDURE — 80053 COMPREHEN METABOLIC PANEL: CPT | Performed by: NURSE PRACTITIONER

## 2020-08-12 PROCEDURE — 36415 COLL VENOUS BLD VENIPUNCTURE: CPT | Performed by: NURSE PRACTITIONER

## 2020-08-12 PROCEDURE — 99214 OFFICE O/P EST MOD 30 MIN: CPT | Mod: 25 | Performed by: NURSE PRACTITIONER

## 2020-08-12 PROCEDURE — 99395 PREV VISIT EST AGE 18-39: CPT | Performed by: NURSE PRACTITIONER

## 2020-08-12 ASSESSMENT — ENCOUNTER SYMPTOMS
HEMATURIA: 0
CONSTIPATION: 0
FREQUENCY: 0
COUGH: 0
NAUSEA: 0
FEVER: 0
JOINT SWELLING: 0
WEAKNESS: 1
ABDOMINAL PAIN: 0
HEARTBURN: 0
DIZZINESS: 0
HEADACHES: 0
CHILLS: 0
SORE THROAT: 0
HEMATOCHEZIA: 0
SHORTNESS OF BREATH: 1
ARTHRALGIAS: 1
NERVOUS/ANXIOUS: 0
PARESTHESIAS: 0
PALPITATIONS: 0
EYE PAIN: 0
DYSURIA: 0
DIARRHEA: 0
MYALGIAS: 1

## 2020-08-12 ASSESSMENT — MIFFLIN-ST. JEOR: SCORE: 2071.59

## 2020-08-12 NOTE — NURSING NOTE
"Chief Complaint   Patient presents with     Physical       Initial /68   Pulse 74   Temp 97.8  F (36.6  C) (Tympanic)   Resp 16   Ht 1.702 m (5' 7\")   Wt 119.3 kg (263 lb)   SpO2 97%   BMI 41.19 kg/m   Estimated body mass index is 41.19 kg/m  as calculated from the following:    Height as of this encounter: 1.702 m (5' 7\").    Weight as of this encounter: 119.3 kg (263 lb).    Patient presents to the clinic using No DME    Health Maintenance that is potentially due pending provider review:  NONE    n/a    Is there anyone who you would like to be able to receive your results? No  If yes have patient fill out TOM    "

## 2020-08-12 NOTE — PATIENT INSTRUCTIONS
1. Routine general medical examination at a health care facility      2. BMI 40.0-44.9, adult (H)  Chronic, worsened  Try 5-10 burst activity as a goal (due to fatigue)  - TSH with free T4 reflex  - COMPREHENSIVE WEIGHT MANAGEMENT  - Comprehensive metabolic panel (BMP + Alb, Alk Phos, ALT, AST, Total. Bili, TP)    3. Fatigue, unspecified type  Chronic, worsened  - TSH with free T4 reflex  - COMPREHENSIVE WEIGHT MANAGEMENT  - Lyme Disease Ernestine with reflex to WB Serum  - Erythrocyte sedimentation rate auto  - Rheumatoid factor  - CRP inflammation  - CK total  - Comprehensive metabolic panel (BMP + Alb, Alk Phos, ALT, AST, Total. Bili, TP)  - CBC with platelets    4. Rash and nonspecific skin eruption  Historical, stable  ?Lyme disease  - TSH with free T4 reflex  - COMPREHENSIVE WEIGHT MANAGEMENT  - Lyme Disease Ernestine with reflex to WB Serum  - Erythrocyte sedimentation rate auto  - Rheumatoid factor  - CRP inflammation  - CK total  - Comprehensive metabolic panel (BMP + Alb, Alk Phos, ALT, AST, Total. Bili, TP)  - CBC with platelets    5. Arthralgia, unspecified joint  Chronic, worsened  - TSH with free T4 reflex  - COMPREHENSIVE WEIGHT MANAGEMENT  - Lyme Disease Ernestine with reflex to WB Serum  - Erythrocyte sedimentation rate auto  - Rheumatoid factor  - CRP inflammation  - CK total  - Comprehensive metabolic panel (BMP + Alb, Alk Phos, ALT, AST, Total. Bili, TP)  - CBC with platelets    Preventive Health Recommendations  Male Ages 26 - 39    Yearly exam:             See your health care provider every year in order to  o   Review health changes.   o   Discuss preventive care.    o   Review your medicines if your doctor has prescribed any.    You should be tested each year for STDs (sexually transmitted diseases), if you re at risk.     After age 35, talk to your provider about cholesterol testing. If you are at risk for heart disease, have your cholesterol tested at least every 5 years.     If you are at risk for  "diabetes, you should have a diabetes test (fasting glucose).  Shots: Get a flu shot each year. Get a tetanus shot every 10 years.     Nutrition:    Eat at least 5 servings of fruits and vegetables daily.     Eat whole-grain bread, whole-wheat pasta and brown rice instead of white grains and rice.     Get adequate Calcium and Vitamin D.     Lifestyle    Exercise for at least 150 minutes a week (30 minutes a day, 5 days a week). This will help you control your weight and prevent disease.     Limit alcohol to one drink per day.     No smoking.     Wear sunscreen to prevent skin cancer.     See your dentist every six months for an exam and cleaning.     HOW TO BE A HEALTHIER \"YOU\"    FREE TOOL called \"Flipora People\"  www.sparkpeople.com is a website that helps you make better food choices, gives you access to free athletic trainers, helps you make exercise goals, has groups to join. You can talk with people, talk to trainers and dieticians.    Get roughly 10,000 steps/day.  These are measured by simple pedometers, smart phones, fit bit devices, and Apple watches.    Progressively increase physical activity to 60 minutes, including combination of cardio & resistance training x 5 days weekly.    Consider hiring a  to develop a structured progressive workout plan that includes both cardio and resistance training. \"Obesity\" (BMI>30) is a disease according to the IRS, so you may be able to use some pre-tax dollars from your medical flexible spending account if you get a letter from your doctor and/or fill out a plan-specific form.    Please consider health psychologist to discuss how depression and/or anxiety impact your eating.    Check out Dr. Santa Reynaga from [facility] - she has cookbooks with low calorie volumetric recipes.    Use a meal replacement daily, such as: Janene's meals, Lean Cuisines, Healthy Choice, Smart Ones, Weight Watchers Meals, Slim Fast and Glucerna shakes and supplement with fresh fruits " and vegetables throughout the day.      Push your water intake. Most people typically need about 2 liters of water daily and more if they are exercising, have a fever or illness. Add Crystal Light or [place] for flavoring if desired. But no pop with calories in it.      Keep a food journal, or download a free Fitness elvira (see next) to track how you are eating. You will be able to track your food and fitness goals.       Fitness apps:   Fitbit  LifeFitness  Lose It!  High Intensity HIIT workouts  MyFitnessPal  NEOU (life and on-demand videos    NOOM  Sworkit Fitness(simple to-go fitness)      Food apps:   Fooducate  HealthyOut (restaurant food tracking)  Lose It!  Spark Recipes  Tap & Track (monitors daily caloric intake)  Tasty  Yum  Weight Watchers      Focus on wet volumetrics, meaning, eat more foods that are high in water and fiber such as fruits and vegetables in order to get that full feeling. These are also good for your overall health as well.      Food delivery services can help you prepare meals for you and your family. Often times, the caloric and nutrition data and serving sizes are available for this food ahead of time when you choose your meals. This can be a time saving maneuver. [facility] [place], [place] has, and delivers Let's Dish, along with fresh low calorie salads.  Food delivery Let's [facility]  [email or web address]  Food delivery Home  www.[email or web address]  Food delivery Freshly www.freshly.com  Food delivery Green  www.greenchef.com  Food delivery Hello Fresh www.[email or web address]      Try Cooking Light www.cookinglight.com for low calorie meal preparation and planning.       Diabetic carb counting: CalorieKing   www.calorieking.com      Other food plan options you can search for on the internet and check out include: Willy DUARTE, [place] Goldberg    Finding Your Ideal Weight  Most of the people in magazines and on TV are far slimmer than average, yet this is the  ideal  that many people aim for. Before  you decide that you won t be happy until you get down to a certain number of pounds, consider:    Your age. You probably wish you could get back to your college weight. But normal changes in metabolism and hormone levels that occur with aging make it unrealistic.    Your gender. In general, men have more muscle and heavier bones than women, which means that healthy men usually weigh more than healthy women of the same height.    Your current weight. If you are very heavy, focus on losing a smaller amount (such as 10 percent of your body weight). Losing just 5 to 10 pounds can improve your health.  Your Body Fat Percentage  A pound of muscle weighs the same as a pound of fat, but it takes up less space. Think of a trained athlete and a  couch potato.  Even though they may be the same height and weight, the athlete looks fitter, is healthier, and probably wears a smaller size of clothing. If you are muscular, a body fat test may be a more accurate measure of your ideal weight than the bathroom scale. Talk to your healthcare provider, who can help you set appropriate goals for yourself.    Weight Management: Healthy Eating  Food is your body s fuel. You can t live without it. The key is to give your body enough nutrients and energy without eating too much. Reading food labels can help you make healthy choices. Also, learn new eating habits to manage your weight.    Eat Less Fat  A gram of fat has almost twice the calories of a gram of protein or carbohydrates. Try to balance your food choices so that 20% to 35% of your calories comes from total fat. This means an average of 2  to 3  grams of fat for each 100 calories you eat.  Eat More Fiber  High-fiber foods are digested more slowly than low-fiber foods, so you feel full longer. Try to get 31 grams of fiber each day. Foods high in fiber include:  -Vegetables and fruits  -Whole-grain or bran breads, pastas, and cereals  -Legumes (beans) and peas  As you begin to eat  more fiber, be sure to drink plenty of water to keep your digestive system working smoothly.  Tips    Don t skip meals. This often leads to overeating later on. It s best to spread your eating throughout the day.    Eat a variety of foods, not just a few favorites.    If you find yourself eating when you re not hungry, ask yourself why. Many of us eat when we re bored, stressed, or just to be polite. Listen to your body. If you re not hungry, get busy doing something else instead of eating.    Eat slower. It takes 20 minutes for your stomach to tell your brain that it s full.    Pay attention to what you eat. Don t read or watch TV during your meal.  7 Worst Junk Ingredients to Avoid   (If you don't know what it is, it probably isn't good to eat it!)  1. Sodium Nitrate (also called Sodium Nitrite)  This is a preservative, coloring, and flavoring commonly found in processed meats like bryant, ham, hot dogs, cold cuts and smoked fish. Studies have shown that it reacts with the body s digestive acids to form a cancer-causing agent called nitrosamines. So double-check that  healthy  turkey for carcinogens before you gobble down your sandwich!  2. Aspartame (aka NutraSweet/Equal)  In scientific terms, this is a chemical combination of two amino acids and methanol. It s better known by the brand names NutraSweet and Equal, which are sweeteners found in countless  diet  desserts, drink mixes, and soft drinks. Aspartame was once thought to be a safe artificial sweetener, but it is now believed to cause cancer and neurological problems such as dizziness and hallucinations.  3. Acesulfame-K  This artificial sweetener is 200 times sweeter than sugar and is often found in chewing gum and soft drinks. When tested in the laboratory, it caused cancer in rats. And that makes this additive a lot less sweet in our opinion!  4. Artificial Food Colorings  There are food colorings being used that are linked with cancer in animal testing  as well as behavioral disorders in children. These include Yellow 5, Red 40, Blue 1, Blue 2, Green 3, Orange B, Red 3 and Yellow 6. Amazingly, these colors have been banned in the [place] yet remain in many American foods. They can easily be avoided by choosing natural foods that aren t chemically or colorfully enhanced.  5. MSG  Monosodium Glutamate (MSG) is an amino acid used as a flavor enhancer in many soups, salad dressings, chips, frozen entrees and restaurant food. This nasty additive can alejandro with the nervous system causing side effects like migraines and overeating in some individuals. MSG appears on labels under several aliases, including yeast extract and calcium caseinate. It s even been found on the labels of organic products! Here s a list of the common aliases for MSG.    Monosodium Glutamate    Hydrolyzed Vegetable Protein    Hydrolyzed Protein    Hydrolyzed Plant Protein    Plant Protein Extract    Sodium Caseinate    Calcium Caseinate    Yeast Extract    Textured Protein    Autolyzed Yeast    Hydrolyzed Oat Flour    6. Trans Fats  Trans fats cause heart disease. It s a proven fact. Before purchasing any packaged food, check the ingredients list. Even if the label boasts  0g trans fats  BEWARE the product may still contain up to a 0.5g of trans fats per serving, if you see the words partially hydrogenated oils on the ingredients list. It s important to avoid even the smallest amount because it can raise your bad cholesterol and lower your good cholesterol, making you susceptible to all kinds of health problems!  7. Sodium Benzoate  Sodium benzoate is a preservative used in many foods and beverages. This ingredient is known to cause hives, asthma and other allergic reactions in sensitive individuals. New research shows that it may also cause behavioral disorders in children. One more reason to avoid this harmful ingredient: When used in beverages that also contain ascorbic acid (vitamin C)  it forms benzene, a known carcinogen. Some drink manufacturers are still using this toxic duo, so you may have benzene lurking in your favorite drink!    Exercise and Activity  Goal: at least 40 minutes daily  Studies show that people who exercise are the most likely to lose weight and keep it off. Exercise burns calories. It helps build muscle to make your body stronger. Make exercise part of your weight-management plan. You must hit moderate- to high cardiovascular workout for weight loss. Get your heart rate up to 75% to help you burn fat. Check online for free calculators.  Make Activity Part of Your Day  You may not think you have the time to exercise. But you can work activity into your daily life. Take 10 minutes out of your lunch hour to take a walk. Walk to the [employer] to get your paper instead of having it delivered. Make it a habit to take the stairs instead of the elevator. Park in the farthest parking spot instead of the closest. You ll be surprised at how fast these little changes can make a difference.  The Benefits of Exercise  Exercise increases your metabolism (the speed at which your body burns calories).  -Regular exercise can increase the amount of muscle in your body. Muscle burns calories faster than fat. The more muscle you have, the more calories you burn.  -Exercise gives you energy and curbs your appetite.  -Exercise decreases stress and helps you sleep better.  Make Exercise Fun  Exercise can be fun. Choose an activity you enjoy. You may even get a friend to do it with you.  -Take a resistance-training or aerobics class  -Join a team sport  -Take a dance class  -Walk the dog  -Ride a bike  If you have health problems, be sure to ask your doctor before you start an exercise program. Have a  help you develop a plan that s safe for you.       4830-3763 Yoel Mcgarry, [address] [facility], [place] [address]. All rights reserved. This information is not intended as a  substitute for professional medical care. Always follow your healthcare professional's instructions.    Here are 10 ways to get you moving more often:     Be less efficient. People typically try to think of ways to make daily tasks easier. But if we make them harder, we can get more exercise, says Staci Neves MS, of Chris Gonzalez, a , , and spokeswoman for the [employer] [place] on Exercise (ACE).  Bring in the groceries from your car one bag at a time so you have to make several trips,  Edinson says.  Put the laundry away a few items at a time, rather than carrying it up in a basket.      Shun labor-saving devices. Wash the car by hand rather than taking it to the car wash.  It takes about an hour and a half to do a good job, and in the meantime you ve gotten great exercise,  Edinson says. Use a push mower rather than a riding mower to groom your lawn.     Going somewhere? Take the long way. Walking up or down a few flights of stairs each day can be good for your heart. Avoid elevators and escalators whenever possible. If you ride the bus or subway to work, get off a stop before your office and walk the extra distance. When you go to the mall or the grocery store, park furthest from the entrance, not as close to it as you can, and you'll get a few extra minutes of walking -- one of the best exercises there is, Dr. Youngblood says.  Walking is great because anyone can do it and you don t need any special equipment other than a properly fitting pair of sneakers.      Be a morning person. Studies show that people who exercise in the morning are more likely to stick with it. As Edinson explains,  Are you going to feel like exercising at the end of a hard day? Probably not. If you do your workout in the morning, you re not only more likely to do it, but you'll also set a positive tone for the day.      Ink the deal. Whether morning, afternoon, or evening, pick the time  that is most convenient for you to exercise and write it down in your daily planner. Keep your exercise routine as you would keep any appointment.     Watch your step. Investing in a good pedometer can help you stay motivated.  If you have a pedometer attached to your waist and you can see how many steps you ve taken, you ll see it doesn t take long to walk 5,000 steps and you will be inspired,  Edinson says. And building up to 10,000 steps a day won t seem like such a daunting a task.     Hire the right help. While weight training is important, if you don t know what you re doing, you run the risk of injuring yourself or not being effective, Edinson says. It s best to get instructions from a  at the gym. You also can buy a weight-training DVD and follow along in your living room.     Keep records. Grab a diary or logbook, and every day that you exercise, write down what you did and for how long. Your records will make it easy for you to see what you ve accomplished and make you more accountable. Blank pages? You d be ashamed.     Phone a friend. Find someone who likes the same activity that you do: walking in the neighborhood, riding bikes, playing tennis -- and make a date to do it together.  Exercising with a friend or in a group can be very motivating,  Ford says.  You are likely to walk longer or bike greater distances if you re talking to a friend along the way. The time will go by faster.  Don t have a morgan who is available? Grab an MP3 player and listen to your favorite music or an audio book while exercising.     Do what you like. Whatever exercise you choose, be sure it s one that you enjoy. You re more likely to stick with it if it s something you have fun doing rather than something you see as a chore, Ford says.     OTHER AVENUES TO INCREASE PHYSICAL FITNESS:    Mapping walks, runs,  and biking   www.[employer]   www.Audi[employer] Group.com    Rating walkability of a  neighborhood   www.walkContrail Systems.com    Treadmill Work Stations   www.Roam & Wander.[employer]    Free Workouts at home   www.[employer]atch.tv    Walking, hiking, biking trails   www.ralistotrails.com    Active volunteer opportunities   www.NetDevices.org    Race or walk/run events in your area   www.active.come    WorkoutmusTaxiForSure.com    www.Elias Borges Urzeda[employer]/   www.HBCS.[employer]    Adventure Vacations   www.Etaphase    Heart rate Monitors   www.polarusa.com    Pedometers   www.[employer]aceuticals    Outdoor treasure hunts on your own   www.T[employer]na [employer]rapeutics    RESOURCES  A course in Weight Loss by Earnest Barrow  Eat, Drink, and Weigh Less by [place] researcher Jamal Gaona, and Carmen Odom  Ultrametabolism by oPllo Zamora.   Recommend keeping Carbohydrates to  30-45g/meal and 15g/snack with fiber of at least 4g/serving, protein goal of 60-90g/day. Keep food diary on myfitnesspal.com. Recommend pedometer with ultimate goal of 10,000 steps a day.   The [place] Instinct by Silvia South.  Dietary Resources for Patients  American Academy of Family Physicians Patient Information Resource   (https://familydoctor.org)   Americans in Motion - Healthy Interventions   (http://www.aafp.org/patient-care/nrn/studies/all/aim-hi.html)   Centers for Disease Control and Prevention, Division of Nutrition, Physical Activity, and Obesity   (https://www.cdc.gov/nccdphp/dnpao)   Levine, Susan. \Hospital Has a New Name and Outlook.\"" Nutrition Source   (https://www.[facility].[employer].edu/nutritionsource)   U.S. Department of Agriculture, Choose My Plate   (https://www.choosemyplate.gov)

## 2020-08-12 NOTE — PROGRESS NOTES
"SUBJECTIVE:   CC: Sergio Us is an 40 year old male who presents for preventative health visit.     Healthy Habits:     Getting at least 3 servings of Calcium per day:  Yes    Bi-annual eye exam:  NO    Dental care twice a year:  Yes    Sleep apnea or symptoms of sleep apnea:  None    Diet:  Regular (no restrictions)    Frequency of exercise:  2-3 days/week    Duration of exercise:  15-30 minutes    Taking medications regularly:  Yes    Medication side effects:  None    PHQ-2 Total Score: 0    Additional concerns today:  Yes    OBESITY  11/2018 Weight Watchers- walk, increase exercise. Goal 10 lbs wt loss.  (he is no longer doing WW, and is getting fatigued with walking)  -super fatigued  -sleep okay, he rolls on back and gets headache, then rolls to the side  Wt Readings from Last 3 Encounters:   08/12/20 119.3 kg (263 lb)   01/13/20 111.1 kg (245 lb)   11/27/19 107.5 kg (237 lb)     ? LYME DISEASE  2016 camping, made a \"weird rash all over his back\"  [facility], [place], [place]  -was treated twice with antibiotic in [place] for 1 week at a time  -fingers, knees, arms, neck pain  -fatigue is persistent  -always a walker, now just exhausted    (Photo from 2016)        Today's PHQ-2 Score:   PHQ-2 ( 1999 Pfizer) 8/12/2020   Q1: Little interest or pleasure in doing things 0   Q2: Feeling down, depressed or hopeless 0   PHQ-2 Score 0   Q1: Little interest or pleasure in doing things Not at all   Q2: Feeling down, depressed or hopeless Not at all   PHQ-2 Score 0       Abuse: Current or Past(Physical, Sexual or Emotional)- No  Do you feel safe in your environment? Yes      Social History     Tobacco Use     Smoking status: Never Smoker     Smokeless tobacco: Never Used   Substance Use Topics     Alcohol use: Yes     Comment: rare         Alcohol Use 8/12/2020   Prescreen: >3 drinks/day or >7 drinks/week? No   Prescreen: >3 drinks/day or >7 drinks/week? -       Last PSA:   PSA   Date Value Ref Range Status "   2018 0.97 0 - 4 ug/L Final     Comment:     Assay Method:  Chemiluminescence using Siemens Vista analyzer       Reviewed orders with patient. Reviewed health maintenance and updated orders accordingly - Yes  Labs reviewed in EPIC  Patient Active Problem List   Diagnosis     Slow transit constipation     Family history of diabetes mellitus     Family history of thyroid disorder     CARDIOVASCULAR SCREENING; LDL GOAL LESS THAN 130     BMI 40.0-44.9, adult (H)     ACP (advance care planning)     Patient is Jainism     Episodic tension-type headache, not intractable     Diverticulosis of sigmoid colon     Fatigue, unspecified type     Rash and nonspecific skin eruption     Arthralgia, unspecified joint     Past Surgical History:   Procedure Laterality Date     COLONOSCOPY N/A 2020    Procedure: COLONOSCOPY;  Surgeon: Royce Garcia MD;  Location: [facility]       Social History     Tobacco Use     Smoking status: Never Smoker     Smokeless tobacco: Never Used   Substance Use Topics     Alcohol use: Yes     Comment: rare     Family History   Problem Relation Age of Onset     Myocardial Infarction Father 44             Heart Failure Father      Diabetes Brother         type 1     Diabetes Mother         type 2     Cerebrovascular Disease Mother      Thyroid Disease Mother      Cancer Other         multiple in the extended family, unsure of types     Cancer Maternal Grandmother         passed from this     No Known Problems Maternal Grandfather      Cancer Paternal Grandmother         passed from that     Brain Cancer Maternal Aunt      Heart Failure Paternal Grandfather         56 yo         Current Outpatient Medications   Medication Sig Dispense Refill     Ascorbic Acid (VITAMIN C PO)        Cholecalciferol (VITAMIN D3 PO) Take by mouth daily       glucosamine-chondroitin 500-400 MG CAPS Take 1 capsule by mouth daily       Inulin (FIBER CHOICE PO)        Pyridoxine HCl (B-6 PO) Take by mouth  "daily       Allergies   Allergen Reactions     Nka [No Known Allergies]        Reviewed and updated as needed this visit by clinical staff  Tobacco  Allergies  Meds  Problems  Med Hx  Surg Hx  Fam Hx         Reviewed and updated as needed this visit by Provider  Tobacco  Allergies  Meds  Problems  Med Hx  Surg Hx  Fam Hx            Review of Systems   Constitutional: Negative for chills and fever.   HENT: Negative for congestion, ear pain, hearing loss and sore throat.    Eyes: Negative for pain and visual disturbance.   Respiratory: Positive for shortness of breath. Negative for cough.    Cardiovascular: Negative for chest pain, palpitations and peripheral edema.   Gastrointestinal: Negative for abdominal pain, constipation, diarrhea, heartburn, hematochezia and nausea.   Genitourinary: Negative for discharge, dysuria, frequency, genital sores, hematuria, impotence and urgency.   Musculoskeletal: Positive for arthralgias and myalgias. Negative for joint swelling.   Skin: Negative for rash.   Neurological: Positive for weakness. Negative for dizziness, headaches and paresthesias.   Psychiatric/Behavioral: Negative for mood changes. The patient is not nervous/anxious.        OBJECTIVE:   /68   Pulse 74   Temp 97.8  F (36.6  C) (Tympanic)   Resp 16   Ht 1.702 m (5' 7\")   Wt 119.3 kg (263 lb)   SpO2 97%   BMI 41.19 kg/m      Physical Exam  GENERAL: healthy, alert and no distress, obese  EYES: Eyes grossly normal to inspection, PERRL and conjunctivae and sclerae normal  HENT: ear canals and TM's normal, nose and mouth without ulcers or lesions  NECK: no adenopathy, no asymmetry, masses, or scars and thyroid normal to palpation  RESP: lungs clear to auscultation - no rales, rhonchi or wheezes  CV: regular rate and rhythm, normal S1 S2, no S3 or S4, no murmur, click or rub, no peripheral edema and peripheral pulses strong  ABDOMEN: soft, nontender, no hepatosplenomegaly, no masses and bowel " sounds normal  MS: no gross musculoskeletal defects noted, no edema  SKIN: no suspicious lesions or rashes  NEURO: Normal strength and tone, mentation intact and speech normal  PSYCH: mentation appears normal, affect normal/bright  LYMPH: no cervical, supraclavicular, axillary, or inguinal adenopathy        ASSESSMENT/PLAN:     1. Routine general medical examination at a health care facility    2. BMI 40.0-44.9, adult (H)  Chronic, worsened  Try 5-10 burst activity as a goal (due to fatigue)  - TSH with free T4 reflex  - COMPREHENSIVE WEIGHT MANAGEMENT  - Comprehensive metabolic panel (BMP + Alb, Alk Phos, ALT, AST, Total. Bili, TP)    3. Fatigue, unspecified type  Chronic, worsened  - TSH with free T4 reflex  - COMPREHENSIVE WEIGHT MANAGEMENT  - Lyme Disease Ernestine with reflex to WB Serum  - Erythrocyte sedimentation rate auto  - Rheumatoid factor  - CRP inflammation  - CK total  - Comprehensive metabolic panel (BMP + Alb, Alk Phos, ALT, AST, Total. Bili, TP)  - CBC with platelets    4. Rash and nonspecific skin eruption  Historical, stable  ?Lyme disease  - TSH with free T4 reflex  - COMPREHENSIVE WEIGHT MANAGEMENT  - Lyme Disease Ernestine with reflex to WB Serum  - Erythrocyte sedimentation rate auto  - Rheumatoid factor  - CRP inflammation  - CK total  - Comprehensive metabolic panel (BMP + Alb, Alk Phos, ALT, AST, Total. Bili, TP)  - CBC with platelets    5. Arthralgia, unspecified joint  Chronic, worsened  - TSH with free T4 reflex  - COMPREHENSIVE WEIGHT MANAGEMENT  - Lyme Disease Ernestine with reflex to WB Serum  - Erythrocyte sedimentation rate auto  - Rheumatoid factor  - CRP inflammation  - CK total  - Comprehensive metabolic panel (BMP + Alb, Alk Phos, ALT, AST, Total. Bili, TP)  - CBC with platelets    Preventive Health Recommendations  Male Ages 26 - 39    Yearly exam:             See your health care provider every year in order to  o   Review health changes.   o   Discuss preventive care.    o   Review your  "medicines if your doctor has prescribed any.    You should be tested each year for STDs (sexually transmitted diseases), if you re at risk.     After age 35, talk to your provider about cholesterol testing. If you are at risk for heart disease, have your cholesterol tested at least every 5 years.     If you are at risk for diabetes, you should have a diabetes test (fasting glucose).  Shots: Get a flu shot each year. Get a tetanus shot every 10 years.     Nutrition:    Eat at least 5 servings of fruits and vegetables daily.     Eat whole-grain bread, whole-wheat pasta and brown rice instead of white grains and rice.     Get adequate Calcium and Vitamin D.     Lifestyle    Exercise for at least 150 minutes a week (30 minutes a day, 5 days a week). This will help you control your weight and prevent disease.     Limit alcohol to one drink per day.     No smoking.     Wear sunscreen to prevent skin cancer.     See your dentist every six months for an exam and cleaning.     HOW TO BE A HEALTHIER \"YOU\"    FREE TOOL called \"[employer] People\"  www.sparkpeople.com is a website that helps you make better food choices, gives you access to free athletic trainers, helps you make exercise goals, has groups to join. You can talk with people, talk to trainers and dieticians.    Get roughly 10,000 steps/day.  These are measured by simple pedometers, smart phones, fit bit devices, and Apple watches.    Progressively increase physical activity to 60 minutes, including combination of cardio & resistance training x 5 days weekly.    Consider hiring a  to develop a structured progressive workout plan that includes both cardio and resistance training. \"Obesity\" (BMI>30) is a disease according to the IRS, so you may be able to use some pre-tax dollars from your medical flexible spending account if you get a letter from your doctor and/or fill out a plan-specific form.    Please consider health psychologist to discuss how " depression and/or anxiety impact your eating.    Check out Dr. Santa Reynaga from [facility] - she has cookbooks with low calorie volumetric recipes.    Use a meal replacement daily, such as: Janene's meals, Lean Cuisines, Healthy Choice, Smart Ones, Weight Watchers Meals, Slim Fast and Glucerna shakes and supplement with fresh fruits and vegetables throughout the day.      Push your water intake. Most people typically need about 2 liters of water daily and more if they are exercising, have a fever or illness. Add Crystal Light or Moffett for flavoring if desired. But no pop with calories in it.      Keep a food journal, or download a free Fitness elvira (see next) to track how you are eating. You will be able to track your food and fitness goals.       Fitness apps:   FitComparaOnline  LifeFitSocial Solutions  Lose It!  High Intensity HIIT workouts  MyFitnessPal  NEOU (life and on-demand videos    NOOM  Sworkit Fitness(simple to-go fitness)      Food apps:   Fooducate  HealthyOut (restaurant food tracking)  Lose It!  Spark Recipes  Tap & Track (monitors daily caloric intake)  Tasty  Yum  Weight Watchers      Focus on wet volumetrics, meaning, eat more foods that are high in water and fiber such as fruits and vegetables in order to get that full feeling. These are also good for your overall health as well.      Food delivery services can help you prepare meals for you and your family. Often times, the caloric and nutrition data and serving sizes are available for this food ahead of time when you choose your meals. This can be a time saving maneuver. [facility] in [place], [place] has, and delivers Let's Dish, along with fresh low calorie salads.  Food delivery Let's [place]  [email or web address].Advice Company  Food delivery Home  www.homechef.com  Food delivery Freshly www.freshly.com  Food delivery Green  www.greenchef.com  Food delivery Hello Fresh www.hellofresh.com      Try Cooking Light www.cookinglight.com for low calorie meal preparation and planning.        Diabetic carb counting: CalorieKing   www.calorieking.com      Other food plan options you can search for on the internet and check out include: Willy DUARTE, [facility]    Finding Your Ideal Weight  Most of the people in magazines and on TV are far slimmer than average, yet this is the  ideal  that many people aim for. Before you decide that you won t be happy until you get down to a certain number of pounds, consider:    Your age. You probably wish you could get back to your college weight. But normal changes in metabolism and hormone levels that occur with aging make it unrealistic.    Your gender. In general, men have more muscle and heavier bones than women, which means that healthy men usually weigh more than healthy women of the same height.    Your current weight. If you are very heavy, focus on losing a smaller amount (such as 10 percent of your body weight). Losing just 5 to 10 pounds can improve your health.  Your Body Fat Percentage  A pound of muscle weighs the same as a pound of fat, but it takes up less space. Think of a trained athlete and a  couch potato.  Even though they may be the same height and weight, the athlete looks fitter, is healthier, and probably wears a smaller size of clothing. If you are muscular, a body fat test may be a more accurate measure of your ideal weight than the bathroom scale. Talk to your healthcare provider, who can help you set appropriate goals for yourself.    Weight Management: Healthy Eating  Food is your body s fuel. You can t live without it. The key is to give your body enough nutrients and energy without eating too much. Reading food labels can help you make healthy choices. Also, learn new eating habits to manage your weight.    Eat Less Fat  A gram of fat has almost twice the calories of a gram of protein or carbohydrates. Try to balance your food choices so that 20% to 35% of your calories comes from total fat. This means an average of 2  to 3  grams  of fat for each 100 calories you eat.  Eat More Fiber  High-fiber foods are digested more slowly than low-fiber foods, so you feel full longer. Try to get 31 grams of fiber each day. Foods high in fiber include:  -Vegetables and fruits  -Whole-grain or bran breads, pastas, and cereals  -Legumes (beans) and peas  As you begin to eat more fiber, be sure to drink plenty of water to keep your digestive system working smoothly.  Tips    Don t skip meals. This often leads to overeating later on. It s best to spread your eating throughout the day.    Eat a variety of foods, not just a few favorites.    If you find yourself eating when you re not hungry, ask yourself why. Many of us eat when we re bored, stressed, or just to be polite. Listen to your body. If you re not hungry, get busy doing something else instead of eating.    Eat slower. It takes 20 minutes for your stomach to tell your brain that it s full.    Pay attention to what you eat. Don t read or watch TV during your meal.  7 Worst Junk Ingredients to Avoid   (If you don't know what it is, it probably isn't good to eat it!)  1. Sodium Nitrate (also called Sodium Nitrite)  This is a preservative, coloring, and flavoring commonly found in processed meats like bryant, ham, hot dogs, cold cuts and smoked fish. Studies have shown that it reacts with the body s digestive acids to form a cancer-causing agent called nitrosamines. So double-check that  healthy  turkey for carcinogens before you gobble down your sandwich!  2. Aspartame (aka NutraSweet/Equal)  In scientific terms, this is a chemical combination of two amino acids and methanol. It s better known by the brand names NutraSweet and Equal, which are sweeteners found in countless  diet  desserts, drink mixes, and soft drinks. Aspartame was once thought to be a safe artificial sweetener, but it is now believed to cause cancer and neurological problems such as dizziness and hallucinations.  3. Acesulfame-K  This  artificial sweetener is 200 times sweeter than sugar and is often found in chewing gum and soft drinks. When tested in the laboratory, it caused cancer in rats. And that makes this additive a lot less sweet in our opinion!  4. Artificial Food Colorings  There are food colorings being used that are linked with cancer in animal testing as well as behavioral disorders in children. These include Yellow 5, Red 40, Blue 1, Blue 2, Green 3, Orange B, Red 3 and Yellow 6. Amazingly, these colors have been banned in the [place] yet remain in many American foods. They can easily be avoided by choosing natural foods that aren t chemically or colorfully enhanced.  5. MSG  Monosodium Glutamate (MSG) is an amino acid used as a flavor enhancer in many soups, salad dressings, chips, frozen entrees and restaurant food. This nasty additive can alejandro with the nervous system causing side effects like migraines and overeating in some individuals. MSG appears on labels under several aliases, including yeast extract and calcium caseinate. It s even been found on the labels of organic products! Here s a list of the common aliases for MSG.    Monosodium Glutamate    Hydrolyzed Vegetable Protein    Hydrolyzed Protein    Hydrolyzed Plant Protein    Plant Protein Extract    Sodium Caseinate    Calcium Caseinate    Yeast Extract    Textured Protein    Autolyzed Yeast    Hydrolyzed Oat Flour    6. Trans Fats  Trans fats cause heart disease. It s a proven fact. Before purchasing any packaged food, check the ingredients list. Even if the label boasts  0g trans fats  BEWARE the product may still contain up to a 0.5g of trans fats per serving, if you see the words partially hydrogenated oils on the ingredients list. It s important to avoid even the smallest amount because it can raise your bad cholesterol and lower your good cholesterol, making you susceptible to all kinds of health problems!  7. Sodium Benzoate  Sodium benzoate is a  preservative used in many foods and beverages. This ingredient is known to cause hives, asthma and other allergic reactions in sensitive individuals. New research shows that it may also cause behavioral disorders in children. One more reason to avoid this harmful ingredient: When used in beverages that also contain ascorbic acid (vitamin C) it forms benzene, a known carcinogen. Some drink manufacturers are still using this toxic duo, so you may have benzene lurking in your favorite drink!    Exercise and Activity  Goal: at least 40 minutes daily  Studies show that people who exercise are the most likely to lose weight and keep it off. Exercise burns calories. It helps build muscle to make your body stronger. Make exercise part of your weight-management plan. You must hit moderate- to high cardiovascular workout for weight loss. Get your heart rate up to 75% to help you burn fat. Check online for free calculators.  Make Activity Part of Your Day  You may not think you have the time to exercise. But you can work activity into your daily life. Take 10 minutes out of your lunch hour to take a walk. Walk to the [employer] to get your paper instead of having it delivered. Make it a habit to take the stairs instead of the elevator. Park in the farthest parking spot instead of the closest. You ll be surprised at how fast these little changes can make a difference.  The Benefits of Exercise  Exercise increases your metabolism (the speed at which your body burns calories).  -Regular exercise can increase the amount of muscle in your body. Muscle burns calories faster than fat. The more muscle you have, the more calories you burn.  -Exercise gives you energy and curbs your appetite.  -Exercise decreases stress and helps you sleep better.  Make Exercise Fun  Exercise can be fun. Choose an activity you enjoy. You may even get a friend to do it with you.  -Take a resistance-training or aerobics class  -Join a team sport  -Take a  dance class  -Walk the dog  -Ride a bike  If you have health problems, be sure to ask your doctor before you start an exercise program. Have a  help you develop a plan that s safe for you.       5216-2185 Yoel Mcgarry, [address] [facility], [place] [address]. All rights reserved. This information is not intended as a substitute for professional medical care. Always follow your healthcare professional's instructions.    Here are 10 ways to get you moving more often:     Be less efficient. People typically try to think of ways to make daily tasks easier. But if we make them harder, we can get more exercise, says Staci Neves, MS, of [employer], a , , and spokeswoman for the [employer] [place] [employer] Exercise (ACE).  Bring in the groceries from your car one bag at a time so you have to make several trips,  Edinson says.  Put the laundry away a few items at a time, rather than carrying it up in a basket.      Shun labor-saving devices. Wash the car by hand rather than taking it to the car wash.  It takes about an hour and a half to do a good job, and in the meantime you ve gotten great exercise,  Edinson says. Use a push mower rather than a riding mower to groom your lawn.     Going somewhere? Take the long way. Walking up or down a few flights of stairs each day can be good for your heart. Avoid elevators and escalators whenever possible. If you ride the bus or subway to work, get off a stop before your office and walk the extra distance. When you go to the mall or the grocery store, park furthest from the entrance, not as close to it as you can, and you'll get a few extra minutes of walking -- one of the best exercises there is, Dr. Youngblood says.  Walking is great because anyone can do it and you don t need any special equipment other than a properly fitting pair of sneakers.      Be a morning person. Studies show that people who exercise in  the morning are more likely to stick with it. As Edinson explains,  Are you going to feel like exercising at the end of a hard day? Probably not. If you do your workout in the morning, you re not only more likely to do it, but you'll also set a positive tone for the day.      Ink the deal. Whether morning, afternoon, or evening, pick the time that is most convenient for you to exercise and write it down in your daily planner. Keep your exercise routine as you would keep any appointment.     Watch your step. Investing in a good pedometer can help you stay motivated.  If you have a pedometer attached to your waist and you can see how many steps you ve taken, you ll see it doesn t take long to walk 5,000 steps and you will be inspired,  Edinson says. And building up to 10,000 steps a day won t seem like such a daunting a task.     Hire the right help. While weight training is important, if you don t know what you re doing, you run the risk of injuring yourself or not being effective, Edinson says. It s best to get instructions from a  at the gym. You also can buy a weight-training DVD and follow along in your living room.     Keep records. Grab a diary or logbook, and every day that you exercise, write down what you did and for how long. Your records will make it easy for you to see what you ve accomplished and make you more accountable. Blank pages? You d be ashamed.     Phone a friend. Find someone who likes the same activity that you do: walking in the neighborhood, riding bikes, playing tennis -- and make a date to do it together.  Exercising with a friend or in a group can be very motivating,  Ford says.  You are likely to walk longer or bike greater distances if you re talking to a friend along the way. The time will go by faster.  Don t have a morgan who is available? Grab an MP3 player and listen to your favorite music or an audio book while exercising.     Do what you like. Whatever exercise  you choose, be sure it s one that you enjoy. You re more likely to stick with it if it s something you have fun doing rather than something you see as a chore, Ford says.     OTHER AVENUES TO INCREASE PHYSICAL FITNESS:    Mapping walks, runs,  and biking   www.[employer]   www.mapmybike.com    Rating walkability of a neighborhood   www.Bluelock    Treadmill Work Stations   www.Heliatek    Free Workouts at home   www.[employer]    Walking, hiking, biking trails   www.ralistotrails.com    Active volunteer opportunities   [email or web address]    Race or walk/run events in your area   www.active.[employer]    [employer]    wwwWallept[employer]/   www.[employer]    [employer]   www.[employer]    Heart rate Monitors   www.polarusa.com    Pedometers   www.[employer]    Outdoor treasure hunts on your own   www.[employer]    RESOURCES  A course in Weight Loss by Earnest Barrow  Eat, Drink, and Weigh Less by [employer] researcher Jamal Gaona, and Carmen Odom  Ultrametabolism by Pollo Zamora.   Recommend keeping Carbohydrates to  30-45g/meal and 15g/snack with fiber of at least 4g/serving, protein goal of 60-90g/day. Keep food diary on myfitnesspal.com. Recommend pedometer with ultimate goal of 10,000 steps a day.   The Willpower Instinct by Silvia South.  Dietary Resources for Patients  American Academy of Family Physicians Patient Information Resource   (https://familydoctor.org)   Americans [employer] - Healthy Interventions   (http://www.aafp.org/patient-care/nrn/studies/all/aim-hi.html)   Centers for Disease Control and Prevention, Division of Nutrition, Physical Activity, and Obesity   (https://www.cdc.gov/nccdphp/dnpao)   [facility] Nutrition Source   (https://www.hospitals.[place].edu/nutritionsource)   U.S. Department of Agriculture, Choose My Plate   (https://www.choosemyplate.gov)               COUNSELING:   Reviewed preventive health counseling, as reflected  "in patient instructions       Regular exercise       Healthy diet/nutrition    Estimated body mass index is 41.19 kg/m  as calculated from the following:    Height as of this encounter: 1.702 m (5' 7\").    Weight as of this encounter: 119.3 kg (263 lb).     Weight management plan: Patient referred to endocrine and/or weight management specialty     reports that he has never smoked. He has never used smokeless tobacco.      Counseling Resources:  ATP IV Guidelines  Pooled Cohorts Equation Calculator  FRAX Risk Assessment  ICSI Preventive Guidelines  Dietary Guidelines for Americans, 2010  USDA's MyPlate  ASA Prophylaxis  Lung CA Screening    Mari Galvan, CNP  [facility]  "

## 2020-08-13 LAB
B BURGDOR IGG+IGM SER QL: 0.88 (ref 0–0.89)
RHEUMATOID FACT SER NEPH-ACNC: <7 IU/ML (ref 0–20)

## 2020-08-14 ENCOUNTER — MYC MEDICAL ADVICE (OUTPATIENT)
Dept: FAMILY MEDICINE | Facility: CLINIC | Age: 40
End: 2020-08-14

## 2022-10-04 PROBLEM — Z78.9 OTHER SPECIFIED HEALTH STATUS: Chronic | Status: ACTIVE | Noted: 2017-06-11

## (undated) RX ORDER — PROPOFOL 10 MG/ML
INJECTION, EMULSION INTRAVENOUS
Status: DISPENSED
Start: 2020-01-13